# Patient Record
Sex: MALE | Race: AMERICAN INDIAN OR ALASKA NATIVE
[De-identification: names, ages, dates, MRNs, and addresses within clinical notes are randomized per-mention and may not be internally consistent; named-entity substitution may affect disease eponyms.]

---

## 2017-02-09 ENCOUNTER — HOSPITAL ENCOUNTER (EMERGENCY)
Dept: HOSPITAL 5 - ED | Age: 42
Discharge: HOME | End: 2017-02-09
Payer: SELF-PAY

## 2017-02-09 VITALS — DIASTOLIC BLOOD PRESSURE: 77 MMHG | SYSTOLIC BLOOD PRESSURE: 123 MMHG

## 2017-02-09 DIAGNOSIS — G43.909: ICD-10-CM

## 2017-02-09 DIAGNOSIS — I10: ICD-10-CM

## 2017-02-09 DIAGNOSIS — Z86.73: ICD-10-CM

## 2017-02-09 DIAGNOSIS — K21.9: ICD-10-CM

## 2017-02-09 DIAGNOSIS — M54.9: ICD-10-CM

## 2017-02-09 DIAGNOSIS — M79.642: ICD-10-CM

## 2017-02-09 DIAGNOSIS — E11.9: Primary | ICD-10-CM

## 2017-02-09 DIAGNOSIS — G89.29: ICD-10-CM

## 2017-02-09 LAB
ANION GAP SERPL CALC-SCNC: 23 MMOL/L
BASOPHILS NFR BLD AUTO: 0.7 % (ref 0–1.8)
BILIRUB UR QL STRIP: (no result)
BLOOD UR QL VISUAL: (no result)
BUN SERPL-MCNC: 13 MG/DL (ref 9–20)
BUN/CREAT SERPL: 16.25 %
CALCIUM SERPL-MCNC: 9.7 MG/DL (ref 8.4–10.2)
CHLORIDE SERPL-SCNC: 94.5 MMOL/L (ref 98–107)
CO2 SERPL-SCNC: 25 MMOL/L (ref 22–30)
EOSINOPHIL NFR BLD AUTO: 1.9 % (ref 0–4.3)
GLUCOSE SERPL-MCNC: 374 MG/DL (ref 75–100)
HCT VFR BLD CALC: 38.4 % (ref 35.5–45.6)
HGB BLD-MCNC: 12.5 GM/DL (ref 11.8–15.2)
KETONES UR STRIP-MCNC: (no result) MG/DL
LEUKOCYTE ESTERASE UR QL STRIP: (no result)
MCH RBC QN AUTO: 25 PG (ref 28–32)
MCHC RBC AUTO-ENTMCNC: 33 % (ref 32–34)
MCV RBC AUTO: 78 FL (ref 84–94)
MUCOUS THREADS #/AREA URNS HPF: (no result) /HPF
NITRITE UR QL STRIP: (no result)
PH UR STRIP: 5 [PH] (ref 5–7)
PLATELET # BLD: 292 K/MM3 (ref 140–440)
POTASSIUM SERPL-SCNC: 4.3 MMOL/L (ref 3.6–5)
PROT UR STRIP-MCNC: (no result) MG/DL
RBC # BLD AUTO: 4.95 M/MM3 (ref 3.65–5.03)
RBC #/AREA URNS HPF: 3 /HPF (ref 0–6)
SODIUM SERPL-SCNC: 138 MMOL/L (ref 137–145)
UROBILINOGEN UR-MCNC: < 2 MG/DL (ref ?–2)
WBC # BLD AUTO: 5 K/MM3 (ref 4.5–11)
WBC #/AREA URNS HPF: < 1 /HPF (ref 0–6)

## 2017-02-09 PROCEDURE — 84484 ASSAY OF TROPONIN QUANT: CPT

## 2017-02-09 PROCEDURE — 82010 KETONE BODYS QUAN: CPT

## 2017-02-09 PROCEDURE — 93010 ELECTROCARDIOGRAM REPORT: CPT

## 2017-02-09 PROCEDURE — 96372 THER/PROPH/DIAG INJ SC/IM: CPT

## 2017-02-09 PROCEDURE — 83036 HEMOGLOBIN GLYCOSYLATED A1C: CPT

## 2017-02-09 PROCEDURE — 80048 BASIC METABOLIC PNL TOTAL CA: CPT

## 2017-02-09 PROCEDURE — 81001 URINALYSIS AUTO W/SCOPE: CPT

## 2017-02-09 PROCEDURE — 99284 EMERGENCY DEPT VISIT MOD MDM: CPT

## 2017-02-09 PROCEDURE — 36415 COLL VENOUS BLD VENIPUNCTURE: CPT

## 2017-02-09 PROCEDURE — 82962 GLUCOSE BLOOD TEST: CPT

## 2017-02-09 PROCEDURE — 85025 COMPLETE CBC W/AUTO DIFF WBC: CPT

## 2017-02-09 PROCEDURE — 84550 ASSAY OF BLOOD/URIC ACID: CPT

## 2017-02-09 PROCEDURE — 71020: CPT

## 2017-02-09 PROCEDURE — 82805 BLOOD GASES W/O2 SATURATION: CPT

## 2017-02-09 PROCEDURE — 93005 ELECTROCARDIOGRAM TRACING: CPT

## 2017-02-09 NOTE — EMERGENCY DEPARTMENT REPORT
ED General Adult HPI





- General


Chief complaint: Dyspnea/Respdistress


Stated complaint: LT HAND SWELLING/NUMBNESS/ELEVATED SUGAR


Time Seen by Provider: 02/09/17 17:18


Source: patient


Mode of arrival: Ambulatory


Limitations: No Limitations





- History of Present Illness


Initial comments: 





41-year-old male with a past medical history of hypertension and chronic back 

pain with associated left leg radiculopathy presents to the hospital with 

complaints of elevated sugar and intermittent left hand pain and swelling.  

Patient denies a previous history of diabetes.  His sugar was in the 380s at 

home so he came to the ER for evaluation.  Patient last meal was this morning 

he has been drinking water since being in the ED.  Patient complains of 

intermittent left hand swelling for the last 4 days.  Pain is in the whole hand

, worse with palpation and movement.  No alleviating factors.  Pain rated 

moderate to severe in intensity.  He complains of shooting pain extending from 

his hand up to his elbow.  Denies a previous history of gout or trauma.  

Patient has been having intermittent hand swelling for the past 6-7 months and 

has not had it evaluated by a physician.  PMD: None





- Related Data


 Previous Rx's











 Medication  Instructions  Recorded  Last Taken  Type


 


Carvedilol [Coreg] 12.5 mg PO BID #60 tablet 05/24/16 Unknown Rx


 


HYDROcodone/APAP 5-325 [[place] 1 each PO Q6HR PRN #20 tablet 05/24/16 Unknown Rx





5-325 mg TAB]    


 


Lisinopril/Hydrochlorothiazide 1 tab PO QDAY #30 tablet 05/24/16 Unknown Rx





[Zestoretic 20-25 mg]    


 


amLODIPine [Norvasc] 10 mg PO DAILY #30 tablet 05/24/16 Unknown Rx


 


cloNIDine [Catapres] 0.1 mg PO BID #60 tablet 05/24/16 Unknown Rx


 


Diazepam Tab [Valium] 5 mg PO TID PRN #12 tablet 08/23/16 Unknown Rx


 


Ibuprofen [Motrin 600 MG tab] 600 mg PO Q8H PRN #20 tablet 08/23/16 Unknown Rx


 


hydrALAZINE [Apresoline TAB] 25 mg PO Q8HR #90 tab 08/23/16 Unknown Rx


 


Ibuprofen [Motrin] 800 mg PO Q8HR PRN #30 tablet 02/09/17 Unknown Rx


 


metFORMIN [Glucophage] 500 mg PO BID #60 tablet 02/09/17 Unknown Rx


 


oxyCODONE /ACETAMINOPHEN [Percocet 1 tab PO Q6HR PRN #20 tablet 02/09/17 

Unknown Rx





5/325]    











 Allergies











Allergy/AdvReac Type Severity Reaction Status Date / Time


 


tomato [Tomato] Allergy  Swelling Verified 08/23/16 09:43














ED Review of Systems


ROS: 


Stated complaint: LT HAND SWELLING/NUMBNESS/ELEVATED SUGAR


Other details as noted in HPI





Comment: All other systems reviewed and negative


Other: 





Constitutional: No fevers chills 


Eyes: No eye pain visual changes 


ENT: No ear pain or throat pain


Neck: Denies pain


Respiratory: Denies cough wheezing shortness of breath 


Cardiovascular: Denies chest pain, palpitations, syncope


GI: Denies abdominal pain, nausea, vomiting, diarrhea


: Denies dysuria


Musculoskeletal: as per hpi


Skin: Denies rash, lesions, erythema


Neurologic: Denies headache, numbness, weakness








ED Past Medical Hx





- Past Medical History


Hx Hypertension: Yes


Hx CVA: Yes (MILD LEFT SIDED WEAKNESS)


Hx Congestive Heart Failure: No


Hx Diabetes: No


Hx GERD: Yes


Hx Headaches / Migraines: Yes


Hx Asthma: No


Hx COPD: No


Additional medical history: sleep apnea.  chronic back pain





- Surgical History


Additional Surgical History: Right upper extremity surgery secondary to trauma, 

cardiac catheterization





- Social History


Smoking Status: Never Smoker


Substance Use Type: Prescribed





- Medications


Home Medications: 


 Home Medications











 Medication  Instructions  Recorded  Confirmed  Last Taken  Type


 


Carvedilol [Coreg] 12.5 mg PO BID #60 tablet 05/24/16  Unknown Rx


 


HYDROcodone/APAP 5-325 [[place] 1 each PO Q6HR PRN #20 tablet 05/24/16  Unknown Rx





5-325 mg TAB]     


 


Lisinopril/Hydrochlorothiazide 1 tab PO QDAY #30 tablet 05/24/16  Unknown Rx





[Zestoretic 20-25 mg]     


 


amLODIPine [Norvasc] 10 mg PO DAILY #30 tablet 05/24/16  Unknown Rx


 


cloNIDine [Catapres] 0.1 mg PO BID #60 tablet 05/24/16  Unknown Rx


 


Diazepam Tab [Valium] 5 mg PO TID PRN #12 tablet 08/23/16  Unknown Rx


 


Ibuprofen [Motrin 600 MG tab] 600 mg PO Q8H PRN #20 tablet 08/23/16  Unknown Rx


 


hydrALAZINE [Apresoline TAB] 25 mg PO Q8HR #90 tab 08/23/16  Unknown Rx


 


Ibuprofen [Motrin] 800 mg PO Q8HR PRN #30 tablet 02/09/17  Unknown Rx


 


metFORMIN [Glucophage] 500 mg PO BID #60 tablet 02/09/17  Unknown Rx


 


oxyCODONE /ACETAMINOPHEN [Percocet 1 tab PO Q6HR PRN #20 tablet 02/09/17  

Unknown Rx





5/325]     














ED Physical Exam





- General


Limitations: No Limitations





- Other


Other exam information: 





General: No limitations, patient is alert in no acute distress


Head exam: Atraumatic, normocephalic


Eyes exam: Normal appearance, pupils equal reactive to light, extraocular 

movements intact


ENT: Moist mucous membrane, normal oropharynx


Neck exam: Normal inspection, full range of motion, no meningismus nontender


Respiratory exam: Clear to auscultation bilateral, no wheezes, rales, crackles


Cardiovascular: Normal rate and rhythm, normal heart sounds


Abdomen: Soft, nondistended, and  nontender, with normal bowel sounds, no 

rebound, or guarding


Extremity: Full range of motion, mild generalized left hand swelling noted 

without warmth or erythema.  Tenderness to the entire hand without any specific 

area being more tender.  Refill less than 2 seconds.


Back: Normal Inspection, full range of motion, chronic back pain


Neurologic: Alert, oriented x3, cranial nerves intact, chronic left leg 

radiculopathy/numbness


Psychiatric: normal affect, normal mood


Skin: Warm, dry, intact





ED Course


 Vital Signs











  02/09/17





  10:06


 


Temperature 97.7 F


 


Pulse Rate 81


 


Respiratory 18





Rate 


 


Blood Pressure 123/77


 


O2 Sat by Pulse 95





Oximetry 














- Reevaluation(s)


Reevaluation #1: 





02/09/17 20:12


Patient treated in the ED with Percocet and insulin subcutaneous as well as 

left wrist splint





ED Medical Decision Making





- Lab Data


Result diagrams: 


 02/09/17 10:44





 02/09/17 10:44








 Lab Results











  02/09/17 02/09/17 02/09/17 Range/Units





  10:11 10:44 10:44 


 


WBC   5.0   (4.5-11.0)  K/mm3


 


RBC   4.95   (3.65-5.03)  M/mm3


 


Hgb   12.5   (11.8-15.2)  gm/dl


 


Hct   38.4   (35.5-45.6)  %


 


MCV   78 L   (84-94)  fl


 


MCH   25 L   (28-32)  pg


 


MCHC   33   (32-34)  %


 


RDW   14.6   (13.2-15.2)  %


 


Plt Count   292   (140-440)  K/mm3


 


Lymph % (Auto)   28.1   (13.4-35.0)  %


 


Mono % (Auto)   7.2   (0.0-7.3)  %


 


Eos % (Auto)   1.9   (0.0-4.3)  %


 


Baso % (Auto)   0.7   (0.0-1.8)  %


 


Lymph #   1.4   (1.2-5.4)  K/mm3


 


Mono #   0.4   (0.0-0.8)  K/mm3


 


Eos #   0.1   (0.0-0.4)  K/mm3


 


Baso #   0.0   (0.0-0.1)  K/mm3


 


Seg Neutrophils %   62.1   (40.0-70.0)  %


 


Seg Neutrophils #   3.1   (1.8-7.7)  K/mm3


 


VBG pH     (7.320-7.420)  


 


Sodium    138  (137-145)  mmol/L


 


Potassium    4.3  (3.6-5.0)  mmol/L


 


Chloride    94.5 L  ()  mmol/L


 


Carbon Dioxide    25  (22-30)  mmol/L


 


Anion Gap    23  mmol/L


 


BUN    13  (9-20)  mg/dL


 


Creatinine    0.8  (0.8-1.5)  mg/dL


 


Estimated GFR    > 60  ml/min


 


BUN/Creatinine Ratio    16.25  %


 


Glucose    374 H  ()  mg/dL


 


POC Glucose  387 H    ()  


 


Hemoglobin A1c     (4-6)  %


 


Uric Acid     (3.5-7.6)  mg/dL


 


Calcium    9.7  (8.4-10.2)  mg/dL


 


Troponin T    < 0.010  (0.00-0.029)  ng/mL


 


Urine Color     (Yellow)  


 


Urine Turbidity     (Clear)  


 


Urine pH     (5.0-7.0)  


 


Ur Specific Gravity     (1.003-1.030)  


 


Urine Protein     (Negative)  mg/dL


 


Urine Glucose (UA)     (Negative)  mg/dL


 


Urine Ketones     (Negative)  mg/dL


 


Urine Blood     (Negative)  


 


Urine Nitrite     (Negative)  


 


Urine Bilirubin     (Negative)  


 


Urine Urobilinogen     (<2.0)  mg/dL


 


Ur Leukocyte Esterase     (Negative)  


 


Urine WBC (Auto)     (0.0-6.0)  /HPF


 


Urine RBC (Auto)     (0.0-6.0)  /HPF


 


Urine Mucus     /HPF


 


Ketones    0.8  (0.2-2.8)  mg/dL














  02/09/17 02/09/17 02/09/17 Range/Units





  10:44 10:44 10:44 


 


WBC     (4.5-11.0)  K/mm3


 


RBC     (3.65-5.03)  M/mm3


 


Hgb     (11.8-15.2)  gm/dl


 


Hct     (35.5-45.6)  %


 


MCV     (84-94)  fl


 


MCH     (28-32)  pg


 


MCHC     (32-34)  %


 


RDW     (13.2-15.2)  %


 


Plt Count     (140-440)  K/mm3


 


Lymph % (Auto)     (13.4-35.0)  %


 


Mono % (Auto)     (0.0-7.3)  %


 


Eos % (Auto)     (0.0-4.3)  %


 


Baso % (Auto)     (0.0-1.8)  %


 


Lymph #     (1.2-5.4)  K/mm3


 


Mono #     (0.0-0.8)  K/mm3


 


Eos #     (0.0-0.4)  K/mm3


 


Baso #     (0.0-0.1)  K/mm3


 


Seg Neutrophils %     (40.0-70.0)  %


 


Seg Neutrophils #     (1.8-7.7)  K/mm3


 


VBG pH  7.403    (7.320-7.420)  


 


Sodium     (137-145)  mmol/L


 


Potassium     (3.6-5.0)  mmol/L


 


Chloride     ()  mmol/L


 


Carbon Dioxide     (22-30)  mmol/L


 


Anion Gap     mmol/L


 


BUN     (9-20)  mg/dL


 


Creatinine     (0.8-1.5)  mg/dL


 


Estimated GFR     ml/min


 


BUN/Creatinine Ratio     %


 


Glucose     ()  mg/dL


 


POC Glucose     ()  


 


Hemoglobin A1c   14.7 H   (4-6)  %


 


Uric Acid    7.0  (3.5-7.6)  mg/dL


 


Calcium     (8.4-10.2)  mg/dL


 


Troponin T     (0.00-0.029)  ng/mL


 


Urine Color     (Yellow)  


 


Urine Turbidity     (Clear)  


 


Urine pH     (5.0-7.0)  


 


Ur Specific Gravity     (1.003-1.030)  


 


Urine Protein     (Negative)  mg/dL


 


Urine Glucose (UA)     (Negative)  mg/dL


 


Urine Ketones     (Negative)  mg/dL


 


Urine Blood     (Negative)  


 


Urine Nitrite     (Negative)  


 


Urine Bilirubin     (Negative)  


 


Urine Urobilinogen     (<2.0)  mg/dL


 


Ur Leukocyte Esterase     (Negative)  


 


Urine WBC (Auto)     (0.0-6.0)  /HPF


 


Urine RBC (Auto)     (0.0-6.0)  /HPF


 


Urine Mucus     /HPF


 


Ketones     (0.2-2.8)  mg/dL














  02/09/17 02/09/17 Range/Units





  11:32 17:28 


 


WBC    (4.5-11.0)  K/mm3


 


RBC    (3.65-5.03)  M/mm3


 


Hgb    (11.8-15.2)  gm/dl


 


Hct    (35.5-45.6)  %


 


MCV    (84-94)  fl


 


MCH    (28-32)  pg


 


MCHC    (32-34)  %


 


RDW    (13.2-15.2)  %


 


Plt Count    (140-440)  K/mm3


 


Lymph % (Auto)    (13.4-35.0)  %


 


Mono % (Auto)    (0.0-7.3)  %


 


Eos % (Auto)    (0.0-4.3)  %


 


Baso % (Auto)    (0.0-1.8)  %


 


Lymph #    (1.2-5.4)  K/mm3


 


Mono #    (0.0-0.8)  K/mm3


 


Eos #    (0.0-0.4)  K/mm3


 


Baso #    (0.0-0.1)  K/mm3


 


Seg Neutrophils %    (40.0-70.0)  %


 


Seg Neutrophils #    (1.8-7.7)  K/mm3


 


VBG pH    (7.320-7.420)  


 


Sodium    (137-145)  mmol/L


 


Potassium    (3.6-5.0)  mmol/L


 


Chloride    ()  mmol/L


 


Carbon Dioxide    (22-30)  mmol/L


 


Anion Gap    mmol/L


 


BUN    (9-20)  mg/dL


 


Creatinine    (0.8-1.5)  mg/dL


 


Estimated GFR    ml/min


 


BUN/Creatinine Ratio    %


 


Glucose    ()  mg/dL


 


POC Glucose   198 H  ()  


 


Hemoglobin A1c    (4-6)  %


 


Uric Acid    (3.5-7.6)  mg/dL


 


Calcium    (8.4-10.2)  mg/dL


 


Troponin T    (0.00-0.029)  ng/mL


 


Urine Color  Straw   (Yellow)  


 


Urine Turbidity  Clear   (Clear)  


 


Urine pH  5.0   (5.0-7.0)  


 


Ur Specific Gravity  1.024   (1.003-1.030)  


 


Urine Protein  <15 mg/dl   (Negative)  mg/dL


 


Urine Glucose (UA)  >=500   (Negative)  mg/dL


 


Urine Ketones  Neg   (Negative)  mg/dL


 


Urine Blood  Neg   (Negative)  


 


Urine Nitrite  Neg   (Negative)  


 


Urine Bilirubin  Neg   (Negative)  


 


Urine Urobilinogen  < 2.0   (<2.0)  mg/dL


 


Ur Leukocyte Esterase  Neg   (Negative)  


 


Urine WBC (Auto)  < 1.0   (0.0-6.0)  /HPF


 


Urine RBC (Auto)  3.0   (0.0-6.0)  /HPF


 


Urine Mucus  Few   /HPF


 


Ketones    (0.2-2.8)  mg/dL














- EKG Data


-: EKG Interpreted by Me (nsr 72 flat lat t waves)





- EKG Data


When compared to previous EKG there are: no significant change (compared to 5/24 /16)





- Radiology Data


Radiology results: image reviewed (cxr : naf)





- Medical Decision Making





Elevated hemoglobin A1c conference.  Patient is a new onset diabetic.  He will 

be  started metformin 500 mg twice a day.,  Shortness time the cause of patient'

s intermittent left hand pain and swelling.  Does not appear to be infectious 

or trauma related at this time.  Patient placed in a wrist splint for comfort 

and will be instructed to follow-up with primary care doctor for further 

evaluation.





- Differential Diagnosis


arthritis, radiculopathy, carpal tunnel syndrome, neuropathy, diabetes


Critical Care Time: No


Critical care attestation.: 


If time is entered above; I have spent that time in minutes in the direct care 

of this critically ill patient, excluding procedure time.








ED Disposition


Clinical Impression: 


 Chronic back pain, Left hand pain, Diabetes mellitus, new onset





Disposition: DISCHARGED TO HOME OR SELFCARE


Is pt being admited?: No


Does the pt Need Aspirin: No


Condition: Stable


Instructions:  Arthralgia (ED), Diabetes Mellitus Type 2 in Adults (ED)


Additional Instructions: 


Take the medication as prescribed.  Is very important that you follow up with 

the primary care doctor and orthopedic doctor provided for further workup and 

evaluation


Prescriptions: 


Ibuprofen [Motrin] 800 mg PO Q8HR PRN #30 tablet


 PRN Reason: Pain


metFORMIN [Glucophage] 500 mg PO BID #60 tablet


oxyCODONE /ACETAMINOPHEN [Percocet 5/325] 1 tab PO Q6HR PRN #20 tablet


 PRN Reason: Pain


Referrals: 


[facility] [Provider Group] - 3-5 Days


MARVIN CAMP MD [Staff Physician] - 3-5 Days (orthopedic)


KATI ALVARADO MD [Staff Physician] - 3-5 Days (primary care)


Time of Disposition: 20:18

## 2017-04-22 ENCOUNTER — HOSPITAL ENCOUNTER (EMERGENCY)
Dept: HOSPITAL 5 - ED | Age: 42
Discharge: HOME | End: 2017-04-22
Payer: SELF-PAY

## 2017-04-22 VITALS — SYSTOLIC BLOOD PRESSURE: 125 MMHG | DIASTOLIC BLOOD PRESSURE: 76 MMHG

## 2017-04-22 DIAGNOSIS — Z91.018: ICD-10-CM
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
## 2017-04-22 DIAGNOSIS — M25.512: ICD-10-CM

## 2017-04-22 DIAGNOSIS — S40.022A: Primary | ICD-10-CM

## 2017-04-22 DIAGNOSIS — K21.9: ICD-10-CM
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
## 2017-04-22 DIAGNOSIS — E11.9: ICD-10-CM
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
## 2017-04-22 DIAGNOSIS — G89.29: ICD-10-CM

## 2017-04-22 DIAGNOSIS — I63.9: ICD-10-CM

## 2017-04-22 DIAGNOSIS — I10: ICD-10-CM
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
## 2017-04-22 DIAGNOSIS — Z87.891: ICD-10-CM

## 2017-04-22 DIAGNOSIS — M62.838: ICD-10-CM

## 2017-04-22 PROCEDURE — 72040 X-RAY EXAM NECK SPINE 2-3 VW: CPT

## 2017-04-22 NOTE — XRAY REPORT
Cervical spine 3 views:



History: Sharp pain radiating to neck.



Findings:



Normal height of vertebral bodies. Minimal decrease in height of C4-C5 

and C5-C6 with anterior osteophytes at the articular surfaces. No 

fracture. Normal prevertebral soft tissue.



Impression:



Spondylosis C4-C5 and C5-C6.

## 2017-04-22 NOTE — EMERGENCY DEPARTMENT REPORT
ED Neck Pain/Injury HPI





- General


Chief Complaint: Neck Pain/Injury


Stated Complaint: LEFT ARM SWOLLEN/NUMB/NECK PAINS/STIFFNESS/CHEST P


Time Seen by Provider: 04/22/17 10:46


Source: patient


Mode of arrival: Ambulatory


Limitations: Physical Limitation





- History of Present Illness


Initial Comments: 





patient states that 2 days ago he was hit in left shoulder/arm by a door.  The 

next day his upper arm was hurting and then today he woke up with pain in left 

side of his neck.  


MD Complaint: neck pain


-: Gradual, This morning


Radiation: left shoulder


Severity: moderate


Severity scale (0 -10): 5


Quality: sharp, other (tight)


Consistency: intermittent


Improves With: remaining still


Worsens With: movement of extremity, movement of neck


Associated Symptoms: denies: headache, fever, numbness, tingling, weakness, 

vertigo, difficulty walking, swollen glands, nausea, vomiting


Treatments Prior to Arrival: none





- Related Data


 Previous Rx's











 Medication  Instructions  Recorded  Last Taken  Type


 


Carvedilol [Coreg] 12.5 mg PO BID #60 tablet 05/24/16 Unknown Rx


 


Lisinopril/Hydrochlorothiazide 1 tab PO QDAY #30 tablet 05/24/16 Unknown Rx





[Zestoretic 20-25 mg]    


 


amLODIPine [Norvasc] 10 mg PO DAILY #30 tablet 05/24/16 Unknown Rx


 


cloNIDine [Catapres] 0.1 mg PO BID #60 tablet 05/24/16 Unknown Rx


 


hydrALAZINE [Apresoline TAB] 25 mg PO Q8HR #90 tab 08/23/16 Unknown Rx


 


metFORMIN [Glucophage] 500 mg PO BID #60 tablet 02/09/17 Unknown Rx


 


Cyclobenzaprine [Flexeril] 10 mg PO TID PRN #30 tablet 04/22/17 Unknown Rx


 


Naproxen [Naprosyn TAB] 500 mg PO BID #30 tablet 04/22/17 Unknown Rx


 


traMADol [Ultram] 50 mg PO Q4HR PRN #30 tablet 04/22/17 Unknown Rx











 Allergies











Allergy/AdvReac Type Severity Reaction Status Date / Time


 


tomato [Tomato] Allergy  Swelling Verified 04/22/17 10:16














ED Review of Systems


ROS: 


Stated complaint: LEFT ARM SWOLLEN/NUMB/NECK PAINS/STIFFNESS/CHEST P


Other details as noted in HPI





Constitutional: denies: chills, fever


Eyes: denies: eye pain, eye discharge, vision change


ENT: denies: ear pain, throat pain


Respiratory: denies: cough, shortness of breath, wheezing


Cardiovascular: denies: chest pain, palpitations


Endocrine: no symptoms reported


Gastrointestinal: denies: abdominal pain, nausea, diarrhea


Genitourinary: denies: urgency, dysuria


Musculoskeletal: arthralgia, myalgia.  denies: back pain


Skin: denies: rash, lesions


Neurological: denies: headache, weakness, numbness, paresthesias


Psychiatric: denies: anxiety, depression


Hematological/Lymphatic: denies: easy bleeding, easy bruising





ED Past Medical Hx





- Past Medical History


Hx Hypertension: Yes


Hx CVA: Yes (MILD LEFT SIDED WEAKNESS)


Hx Congestive Heart Failure: No


Hx Diabetes: Yes


Hx GERD: Yes


Hx Headaches / Migraines: Yes


Hx Asthma: No


Hx COPD: No


Additional medical history: sleep apnea.  chronic back pain





- Surgical History


Additional Surgical History: Right upper extremity surgery secondary to trauma, 

cardiac catheterization





- Social History


Smoking Status: Former Smoker


Substance Use Type: Prescribed





- Medications


Home Medications: 


 Home Medications











 Medication  Instructions  Recorded  Confirmed  Last Taken  Type


 


Carvedilol [Coreg] 12.5 mg PO BID #60 tablet 05/24/16  Unknown Rx


 


Lisinopril/Hydrochlorothiazide 1 tab PO QDAY #30 tablet 05/24/16  Unknown Rx





[Zestoretic 20-25 mg]     


 


amLODIPine [Norvasc] 10 mg PO DAILY #30 tablet 05/24/16  Unknown Rx


 


cloNIDine [Catapres] 0.1 mg PO BID #60 tablet 05/24/16  Unknown Rx


 


hydrALAZINE [Apresoline TAB] 25 mg PO Q8HR #90 tab 08/23/16  Unknown Rx


 


metFORMIN [Glucophage] 500 mg PO BID #60 tablet 02/09/17  Unknown Rx


 


Cyclobenzaprine [Flexeril] 10 mg PO TID PRN #30 tablet 04/22/17  Unknown Rx


 


Naproxen [Naprosyn TAB] 500 mg PO BID #30 tablet 04/22/17  Unknown Rx


 


traMADol [Ultram] 50 mg PO Q4HR PRN #30 tablet 04/22/17  Unknown Rx














ED Physical Exam





- General


Limitations: Physical Limitation


General appearance: alert, in no apparent distress





- Head


Head exam: Present: atraumatic, normocephalic





- Eye


Eye exam: Present: normal appearance, PERRL, EOMI


Pupils: Present: normal accommodation





- ENT


ENT exam: Present: normal exam, mucous membranes moist





- Neck


Neck exam: Present: normal inspection, tenderness (left lateral muscle 

tenderness and swelling noted ).  Absent: full ROM (limited right lateral 

rotation due to pain), lymphadenopathy, thyromegaly





- Respiratory


Respiratory exam: Present: normal lung sounds bilaterally.  Absent: respiratory 

distress





- Cardiovascular


Cardiovascular Exam: Present: regular rate, normal rhythm.  Absent: systolic 

murmur, diastolic murmur, rubs, gallop





- GI/Abdominal


GI/Abdominal exam: Present: soft, normal bowel sounds





- Rectal


Rectal exam: Present: deferred





- Extremities Exam


Extremities exam: Present: normal inspection, tenderness (left shoulder 

tenderness), normal capillary refill.  Absent: full ROM (limited left shoulder 

abduction due to pain), pedal edema, joint swelling





- Expanded Upper Extremity Exam


  ** Left


Shoulder Exam: Present: tenderness (left superior and lateral shoulder and 

proximal arm tenderness).  Absent: full ROM (limitied abduction due to pain), 

abrasion, laceration, ecchymosis, deformity, dislocation, erythema, tenderness 

over AC joint


Elbow exam: Present: normal inspection


Vascular: Present: normal capillary refill.  Absent: vascular compromise, pulse 

deficit brachial art





- Back Exam


Back exam: Present: normal inspection





- Neurological Exam


Neurological exam: Present: alert, oriented X3, CN II-XII intact, reflexes 

normal.  Absent: motor sensory deficit





- Psychiatric


Psychiatric exam: Present: normal affect, normal mood





- Skin


Skin exam: Present: warm, dry, intact, normal color.  Absent: rash





ED Course


 Vital Signs











  04/22/17





  10:18


 


Temperature 98.2 F


 


Pulse Rate 77


 


Respiratory 16





Rate 


 


Blood Pressure 125/76


 


O2 Sat by Pulse 96





Oximetry 














ED Medical Decision Making





- Radiology Data


Radiology results: report reviewed, image reviewed





Spondylosis of C4-C5 and C5-C6.  Shoulder with arthritic changes and cortical 

cystic irregularity consistent with old injury vs chronic impingement.  No 

acute pathology noted





- Medical Decision Making





Patient is non-toxic and hemodynamically stable.  Exam findings are consistent 

with muscular injury and spasm.  Images reviewed and discussed with patient.  

Will treat conservatively now with referral to ortho.  Patient is stable for 

discharge. 


Critical care attestation.: 


If time is entered above; I have spent that time in minutes in the direct care 

of this critically ill patient, excluding procedure time.








ED Disposition


Clinical Impression: 


 Muscle spasms of neck, Left shoulder pain, Contusion of left arm





Disposition: DISCHARGED TO HOME OR SELFCARE


Is pt being admited?: No


Does the pt Need Aspirin: No


Condition: Good


Instructions:  Spasmodic Torticollis (ED), Contusion in Adults (ED), 

Degenerative Disc Disease (ED)


Prescriptions: 


Cyclobenzaprine [Flexeril] 10 mg PO TID PRN #30 tablet


 PRN Reason: Muscle Spasm


Naproxen [Naprosyn TAB] 500 mg PO BID #30 tablet


traMADol [Ultram] 50 mg PO Q4HR PRN #30 tablet


 PRN Reason: Pain


Referrals: 


PRIMARY CARE,MD [Primary Care Provider] - 3-5 Days


MARVIN CAMP MD [Staff Physician] - 3-5 Days


Time of Disposition: 12:26

## 2017-04-22 NOTE — XRAY REPORT
Left shoulder 3 views:



History: Left shoulder pain. Injury.



Findings:



Mild arthritic changes a.c. joint. The glenohumeral joint appears 

unremarkable. Cortical irregularity with cystic changes at the greater 

tuberosity may be related to old injury/chronic impingement. No soft 

tissue calcification.



Impression:



Findings as detailed above.

## 2017-09-18 ENCOUNTER — HOSPITAL ENCOUNTER (EMERGENCY)
Dept: HOSPITAL 5 - ED | Age: 42
Discharge: HOME | End: 2017-09-18
Payer: SELF-PAY

## 2017-09-18 VITALS — SYSTOLIC BLOOD PRESSURE: 132 MMHG | DIASTOLIC BLOOD PRESSURE: 78 MMHG

## 2017-09-18 DIAGNOSIS — K21.9: ICD-10-CM

## 2017-09-18 DIAGNOSIS — E11.9: ICD-10-CM

## 2017-09-18 DIAGNOSIS — R07.89: Primary | ICD-10-CM

## 2017-09-18 DIAGNOSIS — G43.909: ICD-10-CM

## 2017-09-18 DIAGNOSIS — I10: ICD-10-CM

## 2017-09-18 DIAGNOSIS — R06.02: ICD-10-CM

## 2017-09-18 DIAGNOSIS — Z91.018: ICD-10-CM

## 2017-09-18 DIAGNOSIS — Z86.73: ICD-10-CM

## 2017-09-18 LAB
ANION GAP SERPL CALC-SCNC: 20 MMOL/L
BASOPHILS NFR BLD AUTO: 0.4 % (ref 0–1.8)
BUN SERPL-MCNC: 18 MG/DL (ref 9–20)
BUN/CREAT SERPL: 25.71 %
CALCIUM SERPL-MCNC: 9.4 MG/DL (ref 8.4–10.2)
CHLORIDE SERPL-SCNC: 95.8 MMOL/L (ref 98–107)
CO2 SERPL-SCNC: 26 MMOL/L (ref 22–30)
EOSINOPHIL NFR BLD AUTO: 1.2 % (ref 0–4.3)
GLUCOSE SERPL-MCNC: 140 MG/DL (ref 75–100)
HCT VFR BLD CALC: 35 % (ref 35.5–45.6)
HGB BLD-MCNC: 11.8 GM/DL (ref 11.8–15.2)
MCH RBC QN AUTO: 25 PG (ref 28–32)
MCHC RBC AUTO-ENTMCNC: 34 % (ref 32–34)
MCV RBC AUTO: 75 FL (ref 84–94)
PLATELET # BLD: 272 K/MM3 (ref 140–440)
POTASSIUM SERPL-SCNC: 3.5 MMOL/L (ref 3.6–5)
RBC # BLD AUTO: 4.67 M/MM3 (ref 3.65–5.03)
SODIUM SERPL-SCNC: 138 MMOL/L (ref 137–145)
WBC # BLD AUTO: 6.8 K/MM3 (ref 4.5–11)

## 2017-09-18 PROCEDURE — 36415 COLL VENOUS BLD VENIPUNCTURE: CPT

## 2017-09-18 PROCEDURE — 85379 FIBRIN DEGRADATION QUANT: CPT

## 2017-09-18 PROCEDURE — 71010: CPT

## 2017-09-18 PROCEDURE — 80048 BASIC METABOLIC PNL TOTAL CA: CPT

## 2017-09-18 PROCEDURE — 99285 EMERGENCY DEPT VISIT HI MDM: CPT

## 2017-09-18 PROCEDURE — 85025 COMPLETE CBC W/AUTO DIFF WBC: CPT

## 2017-09-18 PROCEDURE — 96374 THER/PROPH/DIAG INJ IV PUSH: CPT

## 2017-09-18 PROCEDURE — 93005 ELECTROCARDIOGRAM TRACING: CPT

## 2017-09-18 PROCEDURE — 84484 ASSAY OF TROPONIN QUANT: CPT

## 2017-09-18 PROCEDURE — 70450 CT HEAD/BRAIN W/O DYE: CPT

## 2017-09-18 PROCEDURE — 83880 ASSAY OF NATRIURETIC PEPTIDE: CPT

## 2017-09-18 PROCEDURE — 93010 ELECTROCARDIOGRAM REPORT: CPT

## 2017-09-18 NOTE — CAT SCAN REPORT
FINAL REPORT



PROCEDURE:  CT HEAD/BRAIN WO CON



TECHNIQUE:  Computerized tomography of the head was performed

without contrast material. 



HISTORY:  headache 



COMPARISON:  12/17/2015



FINDINGS:  

Skull and scalp: Normal.



Paranasal sinuses: Mucous retention cyst in the left maxillary

sinus 



Ventricles and subarachnoid spaces: Normal.



Cerebrum: No evidence of hemorrhage, acute infarction or mass .



Cerebellum and brainstem: No evidence of hemorrhage, acute

infarction or mass.



Vasculature: Normal.



Comments: Moderate diffuse atrophy. Parafalcine calcifications

seen prominently lobular 2.8 centimeters x 1.4 centimeters

anteriorly and 1.8 x 0.8 centimeters posteriorly with other

smaller calcifications present. 



IMPRESSION:  

No acute intracranial pathology seen at this time

## 2017-09-18 NOTE — EMERGENCY DEPARTMENT REPORT
ED Chest Pain HPI





- General


Chief Complaint: Chest Pain


Stated Complaint: CHEST PAIN


Time Seen by Provider: 09/18/17 17:21


Source: patient


Mode of arrival: Stretcher


Limitations: No Limitations





- History of Present Illness


Initial Comments: 





42 years old male history of high blood pressure diabetes and irregular 

heartbeat presented with left-sided chest pain that is being unknown for 2 

weeks worse since last night associated with shortness of breath described the 

pain as pressure that radiated to his left shoulder.  Patient also stated that 

he's been having a headache for the last 2 month does not have any history of 

frequent headache before.  Patient denied any fever no nausea no vomiting.


MD Complaint: chest pain


-: Gradual


Onset: during rest, during exertion


Severity: moderate


Severity scale (0 -10): 6


Quality: pressure


Worsens With: exertion





- Related Data


 Previous Rx's











 Medication  Instructions  Recorded  Last Taken  Type


 


Carvedilol [Coreg] 12.5 mg PO BID #60 tablet 05/24/16 Unknown Rx


 


Lisinopril/Hydrochlorothiazide 1 tab PO QDAY #30 tablet 05/24/16 Unknown Rx





[Zestoretic 20-25 mg]    


 


amLODIPine [Norvasc] 10 mg PO DAILY #30 tablet 05/24/16 Unknown Rx


 


cloNIDine [Catapres] 0.1 mg PO BID #60 tablet 05/24/16 Unknown Rx


 


hydrALAZINE [Apresoline TAB] 25 mg PO Q8HR #90 tab 08/23/16 Unknown Rx


 


metFORMIN [Glucophage] 500 mg PO BID #60 tablet 02/09/17 Unknown Rx


 


Cyclobenzaprine [Flexeril] 10 mg PO TID PRN #30 tablet 04/22/17 Unknown Rx


 


Naproxen [Naprosyn TAB] 500 mg PO BID #30 tablet 04/22/17 Unknown Rx


 


traMADol [Ultram] 50 mg PO Q4HR PRN #30 tablet 04/22/17 Unknown Rx


 


Amoxicillin [Amoxicillin TAB] 875 mg PO BID #14 tablet 09/18/17 Unknown Rx


 


Ondansetron [Zofran Odt] 4 mg PO Q8HR PRN #14 tab.rapdis 09/18/17 Unknown Rx


 


Prednisone [predniSONE 10 mg 10 mg PO .TAPER #1 tab.ds.pk 09/18/17 Unknown Rx





(6-Day Pack, 21 Tabs)]    


 


traMADol [Ultram 50 MG tab] 50 mg PO Q4HR PRN #14 tablet 09/18/17 Unknown Rx











 Allergies











Allergy/AdvReac Type Severity Reaction Status Date / Time


 


tomato [Tomato] Allergy  Swelling Verified 04/22/17 10:16














Heart Score





- HEART Score


History: Moderately suspicious


EKG: Non-specific


Age: < 45


Risk factors: 1-2 risk factors


Troponin: < normal limit


HEART Score: 3





- Critical Actions


Critical Actions: 0-3 pts:0.9-1.7%risk of adverse cardiac event.Candidate for 

discharge





ED Review of Systems


ROS: 


Stated complaint: CHEST PAIN


Other details as noted in HPI





Comment: All other systems reviewed and negative


Constitutional: denies: chills, fever


Respiratory: shortness of breath.  denies: cough


Cardiovascular: chest pain.  denies: palpitations


Gastrointestinal: denies: nausea, vomiting


Skin: denies: rash, lesions





ED Past Medical Hx





- Past Medical History


Hx Hypertension: Yes


Hx CVA: Yes (MILD LEFT SIDED WEAKNESS)


Hx Congestive Heart Failure: No


Hx Diabetes: Yes


Hx GERD: Yes


Hx Headaches / Migraines: Yes


Hx Asthma: No


Hx COPD: No


Additional medical history: sleep apnea.  chronic back pain





- Surgical History


Additional Surgical History: Right upper extremity surgery secondary to trauma, 

cardiac catheterization





- Social History


Smoking Status: Never Smoker


Substance Use Type: None





- Medications


Home Medications: 


 Home Medications











 Medication  Instructions  Recorded  Confirmed  Last Taken  Type


 


Carvedilol [Coreg] 12.5 mg PO BID #60 tablet 05/24/16  Unknown Rx


 


Lisinopril/Hydrochlorothiazide 1 tab PO QDAY #30 tablet 05/24/16  Unknown Rx





[Zestoretic 20-25 mg]     


 


amLODIPine [Norvasc] 10 mg PO DAILY #30 tablet 05/24/16  Unknown Rx


 


cloNIDine [Catapres] 0.1 mg PO BID #60 tablet 05/24/16  Unknown Rx


 


hydrALAZINE [Apresoline TAB] 25 mg PO Q8HR #90 tab 08/23/16  Unknown Rx


 


metFORMIN [Glucophage] 500 mg PO BID #60 tablet 02/09/17  Unknown Rx


 


Cyclobenzaprine [Flexeril] 10 mg PO TID PRN #30 tablet 04/22/17  Unknown Rx


 


Naproxen [Naprosyn TAB] 500 mg PO BID #30 tablet 04/22/17  Unknown Rx


 


traMADol [Ultram] 50 mg PO Q4HR PRN #30 tablet 04/22/17  Unknown Rx


 


Amoxicillin [Amoxicillin TAB] 875 mg PO BID #14 tablet 09/18/17  Unknown Rx


 


Ondansetron [Zofran Odt] 4 mg PO Q8HR PRN #14 tab.rapdis 09/18/17  Unknown Rx


 


Prednisone [predniSONE 10 mg 10 mg PO .TAPER #1 tab.ds.pk 09/18/17  Unknown Rx





(6-Day Pack, 21 Tabs)]     


 


traMADol [Ultram 50 MG tab] 50 mg PO Q4HR PRN #14 tablet 09/18/17  Unknown Rx














ED Physical Exam





- General


Limitations: No Limitations


General appearance: alert, in no apparent distress





- Head


Head exam: Present: atraumatic, normocephalic





- Eye


Eye exam: Present: normal appearance





- ENT


ENT exam: Present: normal exam





- Neck


Neck exam: Present: normal inspection.  Absent: tenderness, full ROM, 

lymphadenopathy





- Respiratory


Respiratory exam: Present: normal lung sounds bilaterally.  Absent: wheezes, 

rales, rhonchi





- Cardiovascular


Cardiovascular Exam: Present: regular rate, normal rhythm, normal heart sounds





- GI/Abdominal


GI/Abdominal exam: Present: soft.  Absent: tenderness, guarding, rebound, rigid

, mass, bruit, pulsatile mass, hernia





- Extremities Exam


Extremities exam: Present: normal inspection, full ROM





- Back Exam


Back exam: Present: normal inspection.  Absent: CVA tenderness (R), CVA 

tenderness (L)





- Neurological Exam


Neurological exam: Present: alert, oriented X3, CN II-XII intact, normal gait





- Psychiatric


Psychiatric exam: Present: normal affect, normal mood





- Skin


Skin exam: Present: warm, intact, normal color





ED Course


 Vital Signs











  09/18/17 09/18/17 09/18/17





  16:20 17:13 18:32


 


Temperature 98.1 F  98 F


 


Pulse Rate 84 82 77


 


Respiratory 18 17 16





Rate   


 


Blood Pressure 125/80 125/79 129/71





[Left]   


 


O2 Sat by Pulse 98 98 98





Oximetry   














- Reevaluation(s)


Reevaluation #1: 





09/18/17 20:35


Patient is that he is feeling much better inform him about his blood work and 

his CT scan of the brain which is basically negative except for sinusitis.  

Patient does have 2 troponin negative in the ER advised him to follow-up with 

his primary care physician for further managment.





ELIU score





- Eliu Score


Age > 65: (0) No


Aspirin use within the Past 7 Days: (0) No


3 or more CAD Risk Factors: (0) No


2 or more Angina events in past 24 hrs: (1) Yes


Known CAD with more than 50% Stenosis: (0) No


Elevated Cardiac Markers: (0) No


ST Deviation Greater than 0.5mm: (0) No


ELIU Score: 1





ED Medical Decision Making





- Lab Data


Result diagrams: 


 09/18/17 16:23





 09/18/17 16:23


Critical care attestation.: 


If time is entered above; I have spent that time in minutes in the direct care 

of this critically ill patient, excluding procedure time.








ED Disposition


Clinical Impression: 


 Chest pain, Headache





Disposition: DC-01 TO HOME OR SELFCARE


Is pt being admited?: No


Condition: Stable


Instructions:  Chest Pain (ED), Sinusitis (ED)

## 2017-09-19 NOTE — XRAY REPORT
Portable chest:



Chest pain.



The cardiac silhouette appears enlarged when compared to prior studies 

including a comparable exam in April 2016. The left lung base is 

partially obscured by the density of the heart. There is no overt 

vascular congestion in the lungs are not otherwise remarkable.



Impression:



The findings are suspicious for an enlarged cardiac contour despite 

positioning of the exam.



Recommendation:



Repeat standard imaging recommended to her evaluate heart size.

## 2018-05-19 ENCOUNTER — HOSPITAL ENCOUNTER (EMERGENCY)
Dept: HOSPITAL 5 - ED | Age: 43
LOS: 1 days | Discharge: HOME | End: 2018-05-20
Payer: SELF-PAY

## 2018-05-19 DIAGNOSIS — G43.909: ICD-10-CM

## 2018-05-19 DIAGNOSIS — Z91.018: ICD-10-CM

## 2018-05-19 DIAGNOSIS — I10: ICD-10-CM

## 2018-05-19 DIAGNOSIS — Z86.73: ICD-10-CM

## 2018-05-19 DIAGNOSIS — M54.9: ICD-10-CM

## 2018-05-19 DIAGNOSIS — G89.29: ICD-10-CM

## 2018-05-19 DIAGNOSIS — E11.9: ICD-10-CM

## 2018-05-19 DIAGNOSIS — Z87.891: ICD-10-CM

## 2018-05-19 DIAGNOSIS — M79.602: ICD-10-CM

## 2018-05-19 DIAGNOSIS — I16.0: Primary | ICD-10-CM

## 2018-05-19 LAB
BASOPHILS # (AUTO): 0 K/MM3 (ref 0–0.1)
BASOPHILS NFR BLD AUTO: 0.7 % (ref 0–1.8)
BUN SERPL-MCNC: 13 MG/DL (ref 9–20)
BUN/CREAT SERPL: 22 %
CALCIUM SERPL-MCNC: 9.5 MG/DL (ref 8.4–10.2)
EOSINOPHIL # BLD AUTO: 0.2 K/MM3 (ref 0–0.4)
EOSINOPHIL NFR BLD AUTO: 2.6 % (ref 0–4.3)
HCT VFR BLD CALC: 39.1 % (ref 35.5–45.6)
HEMOLYSIS INDEX: 3
HGB BLD-MCNC: 13.4 GM/DL (ref 11.8–15.2)
LYMPHOCYTES # BLD AUTO: 1.9 K/MM3 (ref 1.2–5.4)
LYMPHOCYTES NFR BLD AUTO: 29.8 % (ref 13.4–35)
MCH RBC QN AUTO: 26 PG (ref 28–32)
MCHC RBC AUTO-ENTMCNC: 34 % (ref 32–34)
MCV RBC AUTO: 75 FL (ref 84–94)
MONOCYTES # (AUTO): 0.5 K/MM3 (ref 0–0.8)
MONOCYTES % (AUTO): 7.5 % (ref 0–7.3)
PLATELET # BLD: 299 K/MM3 (ref 140–440)
RBC # BLD AUTO: 5.2 M/MM3 (ref 3.65–5.03)

## 2018-05-19 PROCEDURE — 93005 ELECTROCARDIOGRAM TRACING: CPT

## 2018-05-19 PROCEDURE — 99284 EMERGENCY DEPT VISIT MOD MDM: CPT

## 2018-05-19 PROCEDURE — 71275 CT ANGIOGRAPHY CHEST: CPT

## 2018-05-19 PROCEDURE — 85025 COMPLETE CBC W/AUTO DIFF WBC: CPT

## 2018-05-19 PROCEDURE — 84484 ASSAY OF TROPONIN QUANT: CPT

## 2018-05-19 PROCEDURE — 96374 THER/PROPH/DIAG INJ IV PUSH: CPT

## 2018-05-19 PROCEDURE — 93010 ELECTROCARDIOGRAM REPORT: CPT

## 2018-05-19 PROCEDURE — 80048 BASIC METABOLIC PNL TOTAL CA: CPT

## 2018-05-19 PROCEDURE — 83880 ASSAY OF NATRIURETIC PEPTIDE: CPT

## 2018-05-19 PROCEDURE — 82962 GLUCOSE BLOOD TEST: CPT

## 2018-05-19 PROCEDURE — 36415 COLL VENOUS BLD VENIPUNCTURE: CPT

## 2018-05-19 PROCEDURE — 70450 CT HEAD/BRAIN W/O DYE: CPT

## 2018-05-19 NOTE — EMERGENCY DEPARTMENT REPORT
ED Chest Pain HPI





- General


Chief Complaint: Chest Pain


Stated Complaint: CHEST PAIN


Time Seen by Provider: 18 20:21


Source: patient


Mode of arrival: Wheelchair


Limitations: Physical Limitation





- History of Present Illness


Initial Comments: 





The patient is 42 years old male with history of hypertension, diabetes and 

possible coronary artery disease.  Patient is noncompliant with his medication.

  Patient presented to the ER complaining of left-sided chest pain associated 

with left upper extremity numbness and tingling sensation and headache.  

Patient stated that his symptoms started yesterday.  Patient denied any 

shortness of breath or cough.  Patient denies weakness, neck pain or neck 

stiffness.


MD Complaint: chest pain


-: days(s)


Pain Location: left chest


Pain Radiation: LUE


Severity: moderate


Severity scale (0 -10): 10


Quality: heaviness, sharp


Consistency: constant


Improves With: nothing





- Related Data


 Home Medications











 Medication  Instructions  Recorded  Confirmed  Last Taken


 


Lisinopril/Hydrochlorothiazide 20 mg PO QDAY 18 Unknown





[Zestoretic 20-25 mg]    


 


cloNIDine [Catapres] 0.2 mg PO BID 18 Unknown








 Previous Rx's











 Medication  Instructions  Recorded  Last Taken  Type


 


Carvedilol [Coreg] 12.5 mg PO BID #60 tablet 16 Unknown Rx


 


amLODIPine [Norvasc] 10 mg PO DAILY #30 tablet 16 Unknown Rx


 


metFORMIN [Glucophage] 500 mg PO BID #60 tablet 17 Unknown Rx











 Allergies











Allergy/AdvReac Type Severity Reaction Status Date / Time


 


tomato [Tomato] Allergy  Swelling Verified 18 18:30














Heart Score





- HEART Score


History: Moderately suspicious


EKG: Non-specific


Age: < 45


Risk factors: > 3 risk factors or hx of atherosclerotic disease


Troponin: < normal limit


HEART Score: 4





- Critical Actions


Critical Actions: 4-6 pts:12-16.6% risk of adverse cardiac event. Should be 

admitted





ED Review of Systems


ROS: 


Stated complaint: CHEST PAIN


Other details as noted in HPI





Comment: All other systems reviewed and negative


Constitutional: denies: chills, fever


Respiratory: denies: cough, shortness of breath, SOB with exertion, SOB at rest

, wheezing


Cardiovascular: chest pain


Gastrointestinal: denies: abdominal pain, nausea, vomiting, diarrhea, 

constipation, hematemesis, melena, hematochezia


Genitourinary: denies: urgency, dysuria, frequency, hematuria


Neurological: headache, numbness.  denies: weakness, paresthesias, confusion, 

abnormal gait, vertigo





ED Past Medical Hx





- Past Medical History


Hx Hypertension: Yes


Hx CVA: Yes (MILD LEFT SIDED WEAKNESS)


Hx Congestive Heart Failure: No


Hx Diabetes: Yes


Hx GERD: Yes


Hx Headaches / Migraines: Yes


Hx Asthma: No


Hx COPD: No


Additional medical history: sleep apnea.  chronic back pain





- Surgical History


Additional Surgical History: Right upper extremity surgery secondary to trauma, 

cardiac catheterization





- Social History


Smoking Status: Former Smoker


Substance Use Type: Alcohol





- Medications


Home Medications: 


 Home Medications











 Medication  Instructions  Recorded  Confirmed  Last Taken  Type


 


Carvedilol [Coreg] 12.5 mg PO BID #60 tablet 16 Unknown Rx


 


amLODIPine [Norvasc] 10 mg PO DAILY #30 tablet 16 Unknown Rx


 


metFORMIN [Glucophage] 500 mg PO BID #60 tablet 17 Unknown Rx


 


Lisinopril/Hydrochlorothiazide 20 mg PO QDAY 18 Unknown History





[Zestoretic 20-25 mg]     


 


cloNIDine [Catapres] 0.2 mg PO BID 18 Unknown History














ED Physical Exam





- General


Limitations: Physical Limitation


General appearance: alert, in no apparent distress





- Head


Head exam: Present: atraumatic, normocephalic, normal inspection





- Eye


Eye exam: Present: normal appearance





- ENT


ENT exam: Present: normal exam, normal orophraynx, mucous membranes moist





- Neck


Neck exam: Present: normal inspection, full ROM.  Absent: tenderness, 

meningismus, lymphadenopathy





- Respiratory


Respiratory exam: Present: normal lung sounds bilaterally.  Absent: respiratory 

distress, wheezes, rales, rhonchi, stridor, chest wall tenderness, accessory 

muscle use, decreased breath sounds, prolonged expiratory





- Cardiovascular


Cardiovascular Exam: Present: regular rate, normal rhythm, normal heart sounds





- GI/Abdominal


GI/Abdominal exam: Present: soft, normal bowel sounds.  Absent: distended, 

tenderness, guarding, rebound, rigid, organomegaly, mass, bruit, pulsatile mass

, hernia





- Extremities Exam


Extremities exam: Present: normal inspection, full ROM, normal capillary 

refill.  Absent: tenderness, pedal edema, calf tenderness





- Back Exam


Back exam: Present: normal inspection, full ROM.  Absent: tenderness, CVA 

tenderness (R), CVA tenderness (L), muscle spasm, paraspinal tenderness, 

vertebral tenderness





- Neurological Exam


Neurological exam: Present: alert, oriented X3, CN II-XII intact, normal gait





- Skin


Skin exam: Present: warm, intact, normal color





ED Course


 Vital Signs











  18





  17:10 20:56 21:18


 


Temperature 98.3 F  


 


Pulse Rate 119 H 85 94 H


 


Respiratory 22 16 16





Rate   


 


Blood Pressure 193/133  


 


Blood Pressure 193/133 161/102 145/94





[Right]   


 


O2 Sat by Pulse 99 96 95





Oximetry   














  18





  21:26


 


Temperature 


 


Pulse Rate 


 


Respiratory 





Rate 


 


Blood Pressure 145/94


 


Blood Pressure 





[Right] 


 


O2 Sat by Pulse 





Oximetry 














TIM score





- Tim Score


Age > 65: (0) No


Aspirin use within the Past 7 Days: (0) No


3 or more CAD Risk Factors: (0) No


2 or more Angina events in past 24 hrs: (1) Yes


Known CAD with more than 50% Stenosis: (0) No


Elevated Cardiac Markers: (0) No


ST Deviation Greater than 0.5mm: (0) No


TIM Score: 1





ED Medical Decision Making





- Lab Data


Result diagrams: 


 18 18:22





 18 18:22





- EKG Data


-: EKG Interpreted by Me


EKG shows normal: sinus rhythm


Rate: tachycardia





- EKG Data


Interpretation: no acute changes





- Radiology Data


Radiology results: report reviewed





Referring Physician:   LIBBY TEE


Patient Name:   KEVIN MENDEZ


Patient ID:   [id number]


YOB: 1975


Sex:   Male


Accession:   [id number]


Report Date:   2018


Report Status:   Finalized


Findings


[facility] 


[address] [place] [address] 





Cat Scan Report 


Signed 





Patient: KEVIN MENDEZ MR#: [id number] 


: 1975 Acct:[id number] 


Age/Sex: 42 / M ADM Date: 18 


Loc: ED 


Attending Dr: 








Ordering Physician: LIBBY TEE 


Date of Service: 18 


Procedure(s): CT angio chest 


Accession Number(s): [id number] 





cc: LIBBY TEE 








FINAL REPORT 





EXAM: CT ANGIO CHEST 





HISTORY: CHEST PAIN/HIGH BP, R/O DISSECTION 





COMPARISON: None available. 





TECHNIQUE: Contiguous axial images were obtained. Additional 


sagittal and coronal reformatted images were obtained. 


Administration of IV contrast given per institution protocol. 


Images submitted for interpretation. 100 cc Omnipaque 350. 





FINDINGS: 


Heart normal in size. Thoracic aorta normal in caliber. No 


dissection. Ascending thoracic aorta measures 3.7 centimeters in 


diameter upper limits normal. The descending thoracic aorta 


measures 2.8 centimeters. No pulmonary embolus. No pathologically 


enlarged intrathoracic or axillary lymph nodes. 





Mild linear atelectasis at the lung bases. No dense airspace 


consolidation or pleural effusion. 





Visualized upper abdomen is grossly unremarkable. Tiny hiatal 


hernia. Very mild degenerative changes of the thoracic spine. 





IMPRESSION: 


Thoracic aorta is normal in caliber. No dissection or rupture. No 


pulmonary embolus. 





No focal consolidation or pleural effusion. 











Transcribed By: LMA 


Dictated By: GREGORIA GAGE MD 


Electronically Authenticated By: GREGORIA GAGE MD 


Signed Date/Time: 18 





Referring Physician:   LIBBY TEE


Patient Name:   KEVIN MENDEZ


Patient ID:   [id number]


YOB: 1975


Sex:   Male


Accession:   [id number]


Report Date:   2018


Report Status:   Finalized


Findings


[facility] 


[address] [id number] 





Cat Scan Report 


Signed 





Patient: KEVIN MENDEZ MR#: [id number] 


: 1975 Acct:[id number] 


Age/Sex: 42 / M ADM Date: 18 


Loc: ED 


Attending Dr: 








Ordering Physician: LIBBY TEE 


Date of Service: 18 


Procedure(s): CT head/brain wo con 


Accession Number(s): [id number] 





cc: LIBBY TEE 








FINAL REPORT 





EXAM: CT HEAD/BRAIN WO CON 





HISTORY: headache/high BP 





COMPARISON: CT of the head from 2017. 





TECHNIQUE: Axial images obtained skull base through vertex. 





FINDINGS: 


No acute intracranial hemorrhage, midline shift or pathologic 


extra axial fluid collection. Ventricles and cisterns are normal 


in size and configuration for the patient's age. Gray-white 


differentiation preserved. Calvarium grossly intact. Prominent 


calcification along the falx, benign finding. Ocular globes are 


grossly unremarkable. Prominent retention cyst or polyps in the 


floors of the maxillary sinuses. Mastoid air cells are clear. 


Mild mucosal thickening of the ethmoid air cells. 





IMPRESSION: 


No grossly acute intracranial abnormality. 











Transcribed By: LMA 


Dictated By: GREGORIA GAGE MD 


Electronically Authenticated By: GREGORIA GAGE MD 


Signed Date/Time: 18 











DD/DT: 18 


TD/TT: 18











DD/DT: 18 


TD/TT: 18





- Medical Decision Making





Patient stated that he is feeling much better.  Chest pain completely resolved.

  Patient blood pressure now is 140/92.  Patient workup included a chest CTA 

which she did not show any evidence of aortic dissection or aneurysm.  CT brain 

negative for acute finding.  I advised patient to be compliant with his 

medication and to follow up with his primary care physician in the next 2-3 

days.  I added Lasix to his regimen since he has bilateral lower extremity 

edema.


Critical care time in (mins) excluding proc time.: 0


Critical care attestation.: 


If time is entered above; I have spent that time in minutes in the direct care 

of this critically ill patient, excluding procedure time.








ED Disposition


Clinical Impression: 


 Chest pain, Hypertensive urgency, malignant, Left arm pain





Disposition: - TO HOME OR SELFCARE


Is pt being admited?: No


Condition: Stable


Instructions:  Chest Pain (ED), Hypertensive Crisis (ED)


Referrals: 


PRIMARY CARE,MD [Primary Care Provider] - 3-5 Days

## 2018-05-19 NOTE — CAT SCAN REPORT
FINAL REPORT



EXAM:  CT HEAD/BRAIN WO CON



HISTORY:  headache/high BP 



COMPARISON:  CT of the head from September 2017. 



TECHNIQUE:  Axial images obtained skull base through vertex.



FINDINGS:  

No acute intracranial hemorrhage, midline shift or pathologic

extra axial fluid collection. Ventricles and cisterns are normal

in size and configuration for the patient's age. Gray-white

differentiation preserved. Calvarium grossly intact. Prominent

calcification along the falx, benign finding. Ocular globes are

grossly unremarkable. Prominent retention cyst or polyps in the

floors of the maxillary sinuses. Mastoid air cells are clear.

Mild mucosal thickening of the ethmoid air cells. 



IMPRESSION:  

No grossly acute intracranial abnormality.

## 2018-05-19 NOTE — CAT SCAN REPORT
FINAL REPORT



EXAM:  CT ANGIO CHEST



HISTORY:  CHEST PAIN/HIGH BP, R/O DISSECTION 



COMPARISON:  None available. 



TECHNIQUE:  Contiguous axial images were obtained. Additional

sagittal and coronal reformatted images were obtained.

Administration of IV contrast given per institution protocol.

Images submitted for interpretation. 100 cc Omnipaque 350. 



FINDINGS:  

Heart normal in size. Thoracic aorta normal in caliber. No

dissection. Ascending thoracic aorta measures 3.7 centimeters in

diameter upper limits normal. The descending thoracic aorta

measures 2.8 centimeters. No pulmonary embolus. No pathologically

enlarged intrathoracic or axillary lymph nodes. 



Mild linear atelectasis at the lung bases. No dense airspace

consolidation or pleural effusion. 



Visualized upper abdomen is grossly unremarkable. Tiny hiatal

hernia. Very mild degenerative changes of the thoracic spine. 



IMPRESSION:  

Thoracic aorta is normal in caliber. No dissection or rupture. No

pulmonary embolus. 



No focal consolidation or pleural effusion.

## 2018-05-20 VITALS — SYSTOLIC BLOOD PRESSURE: 144 MMHG | DIASTOLIC BLOOD PRESSURE: 92 MMHG

## 2018-05-23 ENCOUNTER — HOSPITAL ENCOUNTER (OUTPATIENT)
Dept: HOSPITAL 5 - XRAY | Age: 43
Discharge: HOME | End: 2018-05-23
Attending: INTERNAL MEDICINE
Payer: COMMERCIAL

## 2018-05-23 DIAGNOSIS — F41.9: ICD-10-CM
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
## 2018-05-23 DIAGNOSIS — S39.82XA: ICD-10-CM

## 2018-05-23 DIAGNOSIS — M19.012: ICD-10-CM
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
## 2018-05-23 DIAGNOSIS — F32.9: ICD-10-CM

## 2018-05-23 DIAGNOSIS — S40.012A: ICD-10-CM

## 2018-05-23 DIAGNOSIS — S49.92XA: Primary | ICD-10-CM
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
## 2018-05-23 PROCEDURE — 72100 X-RAY EXAM L-S SPINE 2/3 VWS: CPT

## 2018-05-23 NOTE — XRAY REPORT
FINAL REPORT



PROCEDURE:  XR SPINE LUMBOSACRAL 2-3V



TECHNIQUE:  Lumbar spine radiographs, including AP, lateral, and

lumbosacral spot views. CPT 62592 







HISTORY:  SPINAL CORD INJURY, NECK PROBLEMS, LEFT SHOULDER

INJURY,CONTUSION 



COMPARISON:  No prior studies are available for comparison.



FINDINGS:  

Alignment: Normal.



Vertebral body heights/Disk spaces: Normal.



Fracture(s): None.



Facets: Normal.



Bone mineralization: Normal.



IMPRESSION:  

Normal Examination.

## 2018-05-23 NOTE — XRAY REPORT
XRAY LEFT SHOULDER THREE VIEWS: 05/23/18 09:10:00





CLINICAL: Spinal cord injury.  Left shoulder injury.



COMPARISON: 04/22/17



FINDINGS: No fracture or dislocation.  Mild glenohumeral joint 

arthritis and mild degenerative change at the greater tuberosity of the 

humerus.  Normal AC joint.  The soft tissues are normal.



IMPRESSION: No acute findings.  Mild glenohumeral joint arthritis and 

mild degenerative change at the rotator cuff insertion.

## 2018-06-19 ENCOUNTER — HOSPITAL ENCOUNTER (EMERGENCY)
Dept: HOSPITAL 5 - ED | Age: 43
Discharge: HOME | End: 2018-06-19
Payer: COMMERCIAL

## 2018-06-19 VITALS — DIASTOLIC BLOOD PRESSURE: 70 MMHG | SYSTOLIC BLOOD PRESSURE: 124 MMHG

## 2018-06-19 DIAGNOSIS — G43.909: ICD-10-CM

## 2018-06-19 DIAGNOSIS — M54.9: ICD-10-CM

## 2018-06-19 DIAGNOSIS — Z91.018: ICD-10-CM

## 2018-06-19 DIAGNOSIS — E11.9: ICD-10-CM

## 2018-06-19 DIAGNOSIS — G47.30: ICD-10-CM

## 2018-06-19 DIAGNOSIS — J81.1: Primary | ICD-10-CM

## 2018-06-19 DIAGNOSIS — I10: ICD-10-CM

## 2018-06-19 DIAGNOSIS — G89.29: ICD-10-CM

## 2018-06-19 DIAGNOSIS — K21.9: ICD-10-CM

## 2018-06-19 DIAGNOSIS — G60.9: ICD-10-CM

## 2018-06-19 PROCEDURE — 99282 EMERGENCY DEPT VISIT SF MDM: CPT

## 2018-06-19 NOTE — EMERGENCY DEPARTMENT REPORT
ED Extremity Problem HPI





- General


Chief complaint: Extremity Injury, Lower


Stated complaint: BILATERAL FOOT SWELLING


Time Seen by Provider: 06/19/18 11:42


Source: patient


Mode of arrival: Ambulatory


Limitations: No Limitations





- History of Present Illness


Initial comments: 





Patient is a 42-year-old American male who is presenting with leg swelling.  

Patient states for the last week he has had some increased swelling to the 

bilateral lower extremities.  Patient also feels an electric sensation 

sometimes in his legs.  Patient is a diabetic.  Patient states he has some mild 

shortness of breath with exertion with no chest pain.  Patient was seen last 

month for same and started on Lasix states he has some slight improvement but 

the swelling has started to return.  Patient denies any fevers chills nausea 

vomiting chest pain at this time.





- Related Data


 Home Medications











 Medication  Instructions  Recorded  Confirmed  Last Taken


 


Lisinopril/Hydrochlorothiazide 20 mg PO QDAY 05/19/18 05/19/18 Unknown





[Zestoretic 20-25 mg]    


 


cloNIDine [Catapres] 0.2 mg PO BID 05/19/18 05/19/18 Unknown








 Previous Rx's











 Medication  Instructions  Recorded  Last Taken  Type


 


Carvedilol [Coreg] 12.5 mg PO BID #60 tablet 05/24/16 Unknown Rx


 


amLODIPine [Norvasc] 10 mg PO DAILY #30 tablet 05/24/16 Unknown Rx


 


metFORMIN [Glucophage] 500 mg PO BID #60 tablet 02/09/17 Unknown Rx


 


Lisinopril/Hydrochlorothiazide 1 tab PO QDAY #30 tab 05/19/18 Unknown Rx





[Zestoretic 20-25 mg]    


 


Ondansetron [Zofran Odt] 4 mg PO Q8HR PRN #14 tab.rapdis 05/19/18 Unknown Rx


 


Furosemide [Lasix TAB] 40 mg PO QDAY #10 tablet 06/19/18 Unknown Rx


 


traMADol [Ultram 50 MG tab] 50 mg PO Q4HR PRN #10 tablet 06/19/18 Unknown Rx











 Allergies











Allergy/AdvReac Type Severity Reaction Status Date / Time


 


tomato [Tomato] Allergy  Swelling Verified 06/19/18 10:28














ED Review of Systems


ROS: 


Stated complaint: BILATERAL FOOT SWELLING


Other details as noted in HPI





Comment: All other systems reviewed and negative





ED Past Medical Hx





- Past Medical History


Hx Hypertension: Yes


Hx CVA: Yes (MILD LEFT SIDED WEAKNESS)


Hx Congestive Heart Failure: No


Hx Diabetes: Yes


Hx GERD: Yes


Hx Headaches / Migraines: Yes


Hx Asthma: No


Hx COPD: No


Additional medical history: sleep apnea.  chronic back pain





- Surgical History


Additional Surgical History: Right upper extremity surgery secondary to trauma, 

cardiac catheterization





- Social History


Smoking Status: Never Smoker


Substance Use Type: None





- Medications


Home Medications: 


 Home Medications











 Medication  Instructions  Recorded  Confirmed  Last Taken  Type


 


Carvedilol [Coreg] 12.5 mg PO BID #60 tablet 05/24/16 05/19/18 Unknown Rx


 


amLODIPine [Norvasc] 10 mg PO DAILY #30 tablet 05/24/16 05/19/18 Unknown Rx


 


metFORMIN [Glucophage] 500 mg PO BID #60 tablet 02/09/17 05/19/18 Unknown Rx


 


Lisinopril/Hydrochlorothiazide 1 tab PO QDAY #30 tab 05/19/18  Unknown Rx





[Zestoretic 20-25 mg]     


 


Lisinopril/Hydrochlorothiazide 20 mg PO QDAY 05/19/18 05/19/18 Unknown History





[Zestoretic 20-25 mg]     


 


Ondansetron [Zofran Odt] 4 mg PO Q8HR PRN #14 tab.rapdis 05/19/18  Unknown Rx


 


cloNIDine [Catapres] 0.2 mg PO BID 05/19/18 05/19/18 Unknown History


 


Furosemide [Lasix TAB] 40 mg PO QDAY #10 tablet 06/19/18  Unknown Rx


 


traMADol [Ultram 50 MG tab] 50 mg PO Q4HR PRN #10 tablet 06/19/18  Unknown Rx














ED Physical Exam





- General


Limitations: No Limitations


General appearance: alert, in no apparent distress





- Head


Head exam: Present: atraumatic, normocephalic





- Eye


Eye exam: Present: normal appearance





- ENT


ENT exam: Present: mucous membranes moist





- Neck


Neck exam: Present: normal inspection





- Respiratory


Respiratory exam: Present: normal lung sounds bilaterally.  Absent: respiratory 

distress, wheezes, rales, rhonchi





- Cardiovascular


Cardiovascular Exam: Present: regular rate, normal rhythm.  Absent: systolic 

murmur, diastolic murmur, rubs, gallop





- GI/Abdominal


GI/Abdominal exam: Present: soft, normal bowel sounds.  Absent: distended, 

tenderness, guarding, rebound





- Rectal


Rectal exam: Present: deferred





- Extremities Exam


Extremities exam: Present: normal inspection, full ROM, pedal edema.  Absent: 

tenderness





- Back Exam


Back exam: Present: normal inspection





- Neurological Exam


Neurological exam: Present: alert, oriented X3





- Psychiatric


Psychiatric exam: Present: normal affect, normal mood





- Skin


Skin exam: Present: warm, dry, intact, normal color.  Absent: rash





ED Course





 Vital Signs











  06/19/18





  10:28


 


Temperature 97.9 F


 


Pulse Rate 81


 


Respiratory 16





Rate 


 


Blood Pressure 129/72


 


O2 Sat by Pulse 96





Oximetry 














ED Medical Decision Making





- Medical Decision Making





Lungs clear do not feel as though he has any pulmonary edema at this time.  He 

does not appear to be any distress.  Patient does have some dependent edema 

which be restarted on Lasix 40 mg daily.  Patient also was given a primary care 

physician for follow-up


Critical care attestation.: 


If time is entered above; I have spent that time in minutes in the direct care 

of this critically ill patient, excluding procedure time.








ED Disposition


Clinical Impression: 


 Dependent edema





Peripheral neuropathy


Qualifiers:


 Peripheral neuropathy type: idiopathic neuropathy, unspecified Qualified Code(s

): G60.9 - Hereditary and idiopathic neuropathy, unspecified





Disposition: DC-01 TO HOME OR SELFCARE


Is pt being admited?: No


Does the pt Need Aspirin: No


Condition: Stable


Instructions:  Peripheral Neuropathy (ED), Leg Edema (ED)


Prescriptions: 


Furosemide [Lasix TAB] 40 mg PO QDAY #10 tablet


traMADol [Ultram 50 MG tab] 50 mg PO Q4HR PRN #10 tablet


 PRN Reason: Pain


Referrals: 


PR[facility] CARE,MD [Primary Care Provider] - 3-5 Days

## 2019-04-05 ENCOUNTER — HOSPITAL ENCOUNTER (EMERGENCY)
Dept: HOSPITAL 5 - ED | Age: 44
Discharge: HOME | End: 2019-04-05
Payer: SELF-PAY

## 2019-04-05 VITALS — SYSTOLIC BLOOD PRESSURE: 109 MMHG | DIASTOLIC BLOOD PRESSURE: 74 MMHG

## 2019-04-05 DIAGNOSIS — A08.4: ICD-10-CM

## 2019-04-05 DIAGNOSIS — E11.9: ICD-10-CM

## 2019-04-05 DIAGNOSIS — K44.9: ICD-10-CM

## 2019-04-05 DIAGNOSIS — G43.909: ICD-10-CM

## 2019-04-05 DIAGNOSIS — I10: ICD-10-CM

## 2019-04-05 DIAGNOSIS — K21.9: Primary | ICD-10-CM

## 2019-04-05 LAB
ALBUMIN SERPL-MCNC: 4.3 G/DL (ref 3.9–5)
ALT SERPL-CCNC: 16 UNITS/L (ref 7–56)
BACTERIA #/AREA URNS HPF: (no result) /HPF
BASOPHILS # (AUTO): 0 K/MM3 (ref 0–0.1)
BASOPHILS NFR BLD AUTO: 0.5 % (ref 0–1.8)
BILIRUB UR QL STRIP: (no result)
BLOOD UR QL VISUAL: (no result)
BUN SERPL-MCNC: 17 MG/DL (ref 9–20)
BUN/CREAT SERPL: 13 %
CALCIUM SERPL-MCNC: 9.8 MG/DL (ref 8.4–10.2)
EOSINOPHIL # BLD AUTO: 0.1 K/MM3 (ref 0–0.4)
EOSINOPHIL NFR BLD AUTO: 2.1 % (ref 0–4.3)
HCT VFR BLD CALC: 36.3 % (ref 35.5–45.6)
HEMOLYSIS INDEX: 3
HGB BLD-MCNC: 12.1 GM/DL (ref 11.8–15.2)
LYMPHOCYTES # BLD AUTO: 1.6 K/MM3 (ref 1.2–5.4)
LYMPHOCYTES NFR BLD AUTO: 30.5 % (ref 13.4–35)
MCHC RBC AUTO-ENTMCNC: 33 % (ref 32–34)
MCV RBC AUTO: 78 FL (ref 84–94)
MONOCYTES # (AUTO): 0.5 K/MM3 (ref 0–0.8)
MONOCYTES % (AUTO): 8.4 % (ref 0–7.3)
MUCOUS THREADS #/AREA URNS HPF: (no result) /HPF
PH UR STRIP: 5 [PH] (ref 5–7)
PLATELET # BLD: 334 K/MM3 (ref 140–440)
PROT UR STRIP-MCNC: (no result) MG/DL
RBC # BLD AUTO: 4.64 M/MM3 (ref 3.65–5.03)
RBC #/AREA URNS HPF: 3 /HPF (ref 0–6)
UROBILINOGEN UR-MCNC: < 2 MG/DL (ref ?–2)
WBC #/AREA URNS HPF: 4 /HPF (ref 0–6)

## 2019-04-05 PROCEDURE — 71046 X-RAY EXAM CHEST 2 VIEWS: CPT

## 2019-04-05 PROCEDURE — 99284 EMERGENCY DEPT VISIT MOD MDM: CPT

## 2019-04-05 PROCEDURE — 85025 COMPLETE CBC W/AUTO DIFF WBC: CPT

## 2019-04-05 PROCEDURE — 36415 COLL VENOUS BLD VENIPUNCTURE: CPT

## 2019-04-05 PROCEDURE — 82962 GLUCOSE BLOOD TEST: CPT

## 2019-04-05 PROCEDURE — 74177 CT ABD & PELVIS W/CONTRAST: CPT

## 2019-04-05 PROCEDURE — 80053 COMPREHEN METABOLIC PANEL: CPT

## 2019-04-05 PROCEDURE — 83690 ASSAY OF LIPASE: CPT

## 2019-04-05 PROCEDURE — 96374 THER/PROPH/DIAG INJ IV PUSH: CPT

## 2019-04-05 PROCEDURE — 96372 THER/PROPH/DIAG INJ SC/IM: CPT

## 2019-04-05 PROCEDURE — 93010 ELECTROCARDIOGRAM REPORT: CPT

## 2019-04-05 PROCEDURE — 84484 ASSAY OF TROPONIN QUANT: CPT

## 2019-04-05 PROCEDURE — 93005 ELECTROCARDIOGRAM TRACING: CPT

## 2019-04-05 PROCEDURE — 81001 URINALYSIS AUTO W/SCOPE: CPT

## 2019-04-05 PROCEDURE — 96375 TX/PRO/DX INJ NEW DRUG ADDON: CPT

## 2019-04-05 NOTE — EMERGENCY DEPARTMENT REPORT
Chief Complaint: Chest Pain


Stated Complaint: CHEST PAIN/ABD PAIN/BACK/HEAD


Time Seen by Provider: 04/05/19 12:36





- HPI


History of Present Illness: 





r side cp rad to r neck





[place] [address]


normal cath 2013





[facility]


htn


dm


obese


cig


hpld











- Exam


Vital Signs: 


                                   Vital Signs











  04/05/19 04/05/19





  12:22 12:36


 


Temperature 97.8 F 97.8 F


 


Pulse Rate 79 18 L


 


Respiratory 16 20





Rate  


 


Blood Pressure  109/74


 


Blood Pressure 109/74 





[Right]  


 


O2 Sat by Pulse 98 98





Oximetry  











MSE screening note: 


Focused history and physical exam performed.


Due to findings the following was ordered:











ED Disposition for MSE


Condition: Stable

## 2019-04-05 NOTE — CAT SCAN REPORT
CT ABDOMEN AND PELVIS WITH CONTRAST



INDICATION: Abdominal pain for one week.  Diarrhea, nausea.



COMPARISON: None similar.



FINDINGS: Abdomen and pelvis CT performed following intravenous 

administration of 100 cc of Omnipaque 300.



LUNG BASES: Slight cardiomegaly.  Mild nonspecific distal esophageal 

wall prominence/thickening, not excluded for gastroesophageal reflux 

and/or hiatal hernia, amongst others in this patient with approximately 

1 cm uncomplicated GE junction diverticulum noted on 8/14/2010 upper GI 

exam.



ABDOMEN: Right hepatic lobe approximately 21 cm in midclavicular 

length.  Left hepatic lobe tip also crosses the midline into the left 

upper quadrant.  Otherwise grossly unremarkable unenhanced liver, 

spleen, gallbladder, pancreas, right adrenal, nonaneurysmal abdominal 

aorta, IVC and non-hydronephrotic kidneys.  A 1.2 cm right interpolar 

renal cortical cyst, axial image 37, series 4.  Subtle 0.8 cm left 

adrenal apical nodular prominence not excluded, axial image 61, series 

2.  No ascites or size significant adenopathy with few small, 

subcentimeter mesenteric and retroperitoneal lymph nodes.  Nonopacified 

GI tract evaluation limited, though grossly nonobstructive.  Normal 

appendix.  Tiny fat containing umbilical hernia with a transverse neck 

of 0.7 cm.



PELVIS: Somewhat delayed renal contrast excretion without collecting 

system or urinary bladder contrast noted approximately 8 minutes post 

injection.  Tiny prostatic calcification with grossly unremarkable 

seminal vesicles and the rectosigmoid.  No free fluid or significant 

adenopathy.



No focal aggressive osseous lesions.



CONCLUSION: No definite acute CT abnormality, though various other 

findings, including somewhat prominent/enlarged liver, delayed renal 

contrast excretion and distal esophageal prominence/thickening noted, 

amongst others, as described.  Please correlate.



Thank you for the opportunity to participate in this patient's care.

## 2019-04-05 NOTE — EMERGENCY DEPARTMENT REPORT
ED Abdominal Pain HPI





- General


Chief Complaint: Chest Pain


Stated Complaint: CHEST PAIN/ABD PAIN/BACK/HEAD


Time Seen by Provider: 19 12:36


Source: patient


Mode of arrival: Ambulatory


Limitations: No Limitations





- History of Present Illness


Initial Comments: 





Patient is a 43-year-old American male who is presenting with 1 week of 

epigastric and chest discomfort.  Patient's has some nausea without vomiting.  

Patient states this burning sharp pain.  This is been present for approximately 

a week.  Vision also states that for the past 3 days has some generalized 

abdominal crampiness with diarrhea as watery and nonbloody.  Patient states the 

diarrhea is new and is has not experienced this in recent months.  Patient 

states that the epigastric discomfort is intermittent he's had had this in the 

past.  Patient denies any fevers chills cough cold congestion at this time.


Severity scale (0 -10): 5





- Related Data


                                Home Medications











 Medication  Instructions  Recorded  Confirmed  Last Taken


 


Lisinopril/Hydrochlorothiazide 20 mg PO QDAY 18 Unknown





[Zestoretic 20-25 mg]    


 


cloNIDine [Catapres] 0.2 mg PO BID 18 Unknown








                                  Previous Rx's











 Medication  Instructions  Recorded  Last Taken  Type


 


Carvedilol [Coreg] 12.5 mg PO BID #60 tablet 16 Unknown Rx


 


amLODIPine [Norvasc] 10 mg PO DAILY #30 tablet 16 Unknown Rx


 


metFORMIN [Glucophage] 500 mg PO BID #60 tablet 17 Unknown Rx


 


Lisinopril/Hydrochlorothiazide 1 tab PO QDAY #30 tab 18 Unknown Rx





[Zestoretic 20-25 mg]    


 


Ondansetron [Zofran Odt] 4 mg PO Q8HR PRN #14 tab.rapdis 18 Unknown Rx


 


Furosemide [Lasix TAB] 40 mg PO QDAY #10 tablet 18 Unknown Rx


 


traMADol [Ultram 50 MG tab] 50 mg PO Q4HR PRN #10 tablet 18 Unknown Rx


 


Amoxicillin/Potassium Clav 1 each PO BID #14 tablet 18 Unknown Rx





[Augmentin 875-125 Tablet]    


 


Fluticasone [Flonase] 1 spray NS QDAY #1 bottle 18 Unknown Rx


 


Ibuprofen [Motrin] 600 mg PO Q8H PRN #20 tablet 18 Unknown Rx


 


traMADol [Ultram] 50 mg PO Q6HR PRN #10 tablet 18 Unknown Rx


 


Dicyclomine [Bentyl] 20 mg PO QID #10 tablet 19 Unknown Rx


 


Diphenoxylate/Atropine [Lomotil] 1 tab PO Q4H PRN #10 tablet 19 Unknown Rx


 


Famotidine [Pepcid] 40 mg PO QHS #10 tablet 19 Unknown Rx


 


Ondansetron [Zofran Odt] 4 mg PO Q8HR #10 tab.rapdis 19 Unknown Rx


 


Pantoprazole [Protonix] 40 mg PO QDAY #30 tablet 19 Unknown Rx


 


traMADol [Ultram] 50 mg PO Q6HR PRN #12 tablet 19 Unknown Rx











                                    Allergies











Allergy/AdvReac Type Severity Reaction Status Date / Time


 


tomato [Tomato] Allergy  Swelling Verified 18 10:28














ED Review of Systems


ROS: 


Stated complaint: CHEST PAIN/ABD PAIN/BACK/HEAD


Other details as noted in HPI





Comment: All other systems reviewed and negative





ED Past Medical Hx





- Past Medical History


Previous Medical History?: Yes


Hx Hypertension: Yes


Hx CVA: Yes (MILD LEFT SIDED WEAKNESS)


Hx Congestive Heart Failure: No


Hx Diabetes: Yes


Hx GERD: Yes


Hx Headaches / Migraines: Yes


Hx Asthma: No


Hx COPD: No


Additional medical history: sleep apnea.  chronic back pain





- Surgical History


Past Surgical History?: Yes


Additional Surgical History: Right upper extremity surgery secondary to trauma, 

cardiac catheterization





- Social History


Smoking Status: Never Smoker


Substance Use Type: None





- Medications


Home Medications: 


                                Home Medications











 Medication  Instructions  Recorded  Confirmed  Last Taken  Type


 


Carvedilol [Coreg] 12.5 mg PO BID #60 tablet 16 Unknown Rx


 


amLODIPine [Norvasc] 10 mg PO DAILY #30 tablet 16 Unknown Rx


 


metFORMIN [Glucophage] 500 mg PO BID #60 tablet 17 Unknown Rx


 


Lisinopril/Hydrochlorothiazide 1 tab PO QDAY #30 tab 18  Unknown Rx





[Zestoretic 20-25 mg]     


 


Lisinopril/Hydrochlorothiazide 20 mg PO QDAY 18 Unknown History





[Zestoretic 20-25 mg]     


 


Ondansetron [Zofran Odt] 4 mg PO Q8HR PRN #14 tab.rapdis 18  Unknown Rx


 


cloNIDine [Catapres] 0.2 mg PO BID 18 Unknown History


 


Furosemide [Lasix TAB] 40 mg PO QDAY #10 tablet 18  Unknown Rx


 


traMADol [Ultram 50 MG tab] 50 mg PO Q4HR PRN #10 tablet 18  Unknown Rx


 


Amoxicillin/Potassium Clav 1 each PO BID #14 tablet 18  Unknown Rx





[Augmentin 875-125 Tablet]     


 


Fluticasone [Flonase] 1 spray NS QDAY #1 bottle 18  Unknown Rx


 


Ibuprofen [Motrin] 600 mg PO Q8H PRN #20 tablet 18  Unknown Rx


 


traMADol [Ultram] 50 mg PO Q6HR PRN #10 tablet 18  Unknown Rx


 


Dicyclomine [Bentyl] 20 mg PO QID #10 tablet 19  Unknown Rx


 


Diphenoxylate/Atropine [Lomotil] 1 tab PO Q4H PRN #10 tablet 19  Unknown 

Rx


 


Famotidine [Pepcid] 40 mg PO QHS #10 tablet 19  Unknown Rx


 


Ondansetron [Zofran Odt] 4 mg PO Q8HR #10 tab.rapdis 19  Unknown Rx


 


Pantoprazole [Protonix] 40 mg PO QDAY #30 tablet 19  Unknown Rx


 


traMADol [Ultram] 50 mg PO Q6HR PRN #12 tablet 19  Unknown Rx














ED Physical Exam





- General


Limitations: No Limitations


General appearance: alert, in no apparent distress





- Head


Head exam: Present: atraumatic, normocephalic





- Eye


Eye exam: Present: normal appearance, PERRL, EOMI





- ENT


ENT exam: Present: mucous membranes moist





- Neck


Neck exam: Present: normal inspection





- Respiratory


Respiratory exam: Present: normal lung sounds bilaterally.  Absent: respiratory 

distress, wheezes, rales, rhonchi, stridor





- Cardiovascular


Cardiovascular Exam: Present: regular rate, normal rhythm, normal heart sounds. 

 Absent: systolic murmur, diastolic murmur, rubs, gallop





- GI/Abdominal


GI/Abdominal exam: Present: soft, tenderness (epigastric), normal bowel sounds. 

 Absent: distended, guarding, rebound





- Rectal


Rectal exam: Present: deferred





- Extremities Exam


Extremities exam: Present: normal inspection





- Back Exam


Back exam: Present: normal inspection





- Neurological Exam


Neurological exam: Present: alert, oriented X3





- Psychiatric


Psychiatric exam: Present: normal affect, normal mood





- Skin


Skin exam: Present: warm, dry, intact, normal color.  Absent: rash





ED Course





                                   Vital Signs











  19





  12:22 12:36 13:36


 


Temperature 97.8 F 97.8 F 


 


Pulse Rate 79 18 L 


 


Respiratory 16 20 18





Rate   


 


Blood Pressure  109/74 


 


Blood Pressure 109/74  





[Right]   


 


O2 Sat by Pulse 98 98 





Oximetry   














ED Medical Decision Making





- Lab Data


Result diagrams: 


                                 19 12:43





                                 19 12:43








Labs











  19





  12:43 12:43 12:47


 


WBC  5.4  


 


RBC  4.64  


 


Hgb  12.1  


 


Hct  36.3  


 


MCV  78 L  


 


MCH  26 L  


 


MCHC  33  


 


RDW  15.6 H  


 


Plt Count  334  


 


Lymph % (Auto)  30.5  


 


Mono % (Auto)  8.4 H  


 


Eos % (Auto)  2.1  


 


Baso % (Auto)  0.5  


 


Lymph #  1.6  


 


Mono #  0.5  


 


Eos #  0.1  


 


Baso #  0.0  


 


Seg Neutrophils %  58.5  


 


Seg Neutrophils #  3.2  


 


Sodium   132 L 


 


Potassium   4.3 


 


Chloride   97.8 L 


 


Carbon Dioxide   23 


 


Anion Gap   16 


 


BUN   17 


 


Creatinine   1.3 


 


Estimated GFR   > 60 


 


BUN/Creatinine Ratio   13 


 


Glucose   243 H 


 


POC Glucose    227 H


 


Calcium   9.8 


 


Total Bilirubin   0.30 


 


AST   14 


 


ALT   16 


 


Alkaline Phosphatase   61 


 


Troponin T   < 0.010 


 


Total Protein   7.9 


 


Albumin   4.3 


 


Albumin/Globulin Ratio   1.2 


 


Lipase   49 


 


Urine Color   


 


Urine Turbidity   


 


Urine pH   


 


Ur Specific Gravity   


 


Urine Protein   


 


Urine Glucose (UA)   


 


Urine Ketones   


 


Urine Blood   


 


Urine Nitrite   


 


Urine Bilirubin   


 


Urine Urobilinogen   


 


Ur Leukocyte Esterase   


 


Urine WBC (Auto)   


 


Urine RBC (Auto)   


 


Urine Bacteria (Auto)   


 


Urine Mucus   














  19





  13:33


 


WBC 


 


RBC 


 


Hgb 


 


Hct 


 


MCV 


 


MCH 


 


MCHC 


 


RDW 


 


Plt Count 


 


Lymph % (Auto) 


 


Mono % (Auto) 


 


Eos % (Auto) 


 


Baso % (Auto) 


 


Lymph # 


 


Mono # 


 


Eos # 


 


Baso # 


 


Seg Neutrophils % 


 


Seg Neutrophils # 


 


Sodium 


 


Potassium 


 


Chloride 


 


Carbon Dioxide 


 


Anion Gap 


 


BUN 


 


Creatinine 


 


Estimated GFR 


 


BUN/Creatinine Ratio 


 


Glucose 


 


POC Glucose 


 


Calcium 


 


Total Bilirubin 


 


AST 


 


ALT 


 


Alkaline Phosphatase 


 


Troponin T 


 


Total Protein 


 


Albumin 


 


Albumin/Globulin Ratio 


 


Lipase 


 


Urine Color  Yellow


 


Urine Turbidity  Slightly-cloudy


 


Urine pH  5.0


 


Ur Specific Gravity  1.015


 


Urine Protein  <15 mg/dl


 


Urine Glucose (UA)  >=500


 


Urine Ketones  Neg


 


Urine Blood  Neg


 


Urine Nitrite  Neg


 


Urine Bilirubin  Neg


 


Urine Urobilinogen  < 2.0


 


Ur Leukocyte Esterase  Neg


 


Urine WBC (Auto)  4.0


 


Urine RBC (Auto)  3.0


 


Urine Bacteria (Auto)  2+


 


Urine Mucus  Few














- EKG Data


-: EKG Interpreted by Me


EKG shows normal: sinus rhythm, axis, intervals, QRS complexes, ST-T waves


Rate: normal





- EKG Data


Interpretation: normal EKG





- Radiology Data





[facility] 


[address] [place] [address] 





Cat Scan Report 


Signed 





Patient: KEVIN MENDEZ MR#: [id number] 





: 1975 Acct:[id number] 





Age/Sex: 43 / M ADM Date: 19 





Loc: ED 


Attending Dr: 








Ordering Physician: BROCK LUCERO MD 


Date of Service: 19 


Procedure(s): CT abdomen pelvis w con 


Accession Number(s): [id number] 





cc: BROCK LUCERO MD 








CT ABDOMEN AND PELVIS WITH CONTRAST 





INDICATION: Abdominal pain for one week. Diarrhea, nausea. 





COMPARISON: None similar. 





FINDINGS: Abdomen and pelvis CT performed following intravenous 


administration of 100 cc of Omnipaque 300. 





LUNG BASES: Slight cardiomegaly. Mild nonspecific distal esophageal 


wall prominence/thickening, not excluded for gastroesophageal reflux 


and/or hiatal hernia, amongst others in this patient with approximately 


1 cm uncomplicated GE junction diverticulum noted on 2010 upper GI 


exam. 





ABDOMEN: Right hepatic lobe approximately 21 cm in midclavicular 


length. Left hepatic lobe tip also crosses the midline into the left


upper quadrant. Otherwise grossly unremarkable unenhanced liver, 


spleen, gallbladder, pancreas, right adrenal, nonaneurysmal abdominal 


aorta, IVC and non-hydronephrotic kidneys. A 1.2 cm right interpolar 


renal cortical cyst, axial image 37, series 4. Subtle 0.8 cm left 


adrenal apical nodular prominence not excluded, axial image 61, series 


2. No ascites or size significant adenopathy with few small, 


subcentimeter mesenteric and retroperitoneal lymph nodes. Nonopacified 


GI tract evaluation limited, though grossly nonobstructive. Normal 


appendix. Tiny fat containing umbilical hernia with a transverse neck 


of 0.7 cm. 





PELVIS: Somewhat delayed renal contrast excretion without collecting 


system or urinary bladder contrast noted approximately 8 minutes post 


injection. Tiny prostatic calcification with grossly unremarkable 


seminal vesicles and the rectosigmoid. No free fluid or significant 


adenopathy. 





No focal aggressive osseous lesions. 





CONCLUSION: No definite acute CT abnormality, though various other 


findings, including somewhat prominent/enlarged liver, delayed renal 


contrast excretion and distal esophageal prominence/thickening noted, 


amongst others, as described. Please correlate. 





Thank you for the opportunity to participate in this patient's care. 





Transcribed By: RS 


Dictated By: GIA HO MD 


Electronically Authenticated By: GIA HO MD 


Signed Date/Time: 19 











DD/DT: 19 


TD/TT: 19





- Medical Decision Making





Patient likely with GERD secondary to a hiatal hernia.  Patient also likely with

a viral 


Critical care attestation.: 


If time is entered above; I have spent that time in minutes in the direct care 

of this critically ill patient, excluding procedure time.








ED Disposition


Clinical Impression: 


 GERD (gastroesophageal reflux disease), Hiatal hernia, Viral enteritis





Disposition: - TO HOME OR SELFCARE


Is pt being admited?: No


Does the pt Need Aspirin: No


Condition: Stable


Instructions:  Hiatal Hernia (ED), Diet for Ulcers and Gastritis (ED), 

Gastroesophageal Reflux Disease (ED), Gastroenteritis (ED)


Referrals: 


[facility] [Primary Care Provider] - 3-5 Days


[facility] [Provider Group] - 3-5 Days


Time of Disposition: 15:50

## 2019-04-05 NOTE — XRAY REPORT
CHEST 2 VIEWS



INDICATION: Chest pain.



COMPARISON: 9/18/2017.



FINDINGS: PA and lateral chest radiographs now demonstrate normal 

cardiomediastinal silhouette. Clear lungs. Intact bones.



CONCLUSION: No acute disease in the chest.



Thank you for the opportunity to participate in this patient's care.

## 2019-04-13 ENCOUNTER — HOSPITAL ENCOUNTER (EMERGENCY)
Dept: HOSPITAL 5 - ED | Age: 44
Discharge: HOME | End: 2019-04-13
Payer: COMMERCIAL

## 2019-04-13 VITALS — SYSTOLIC BLOOD PRESSURE: 131 MMHG | DIASTOLIC BLOOD PRESSURE: 95 MMHG

## 2019-04-13 DIAGNOSIS — Z79.84: ICD-10-CM

## 2019-04-13 DIAGNOSIS — E11.9: ICD-10-CM

## 2019-04-13 DIAGNOSIS — Z86.73: ICD-10-CM

## 2019-04-13 DIAGNOSIS — G89.29: ICD-10-CM

## 2019-04-13 DIAGNOSIS — G43.909: ICD-10-CM

## 2019-04-13 DIAGNOSIS — I10: ICD-10-CM

## 2019-04-13 DIAGNOSIS — K44.9: ICD-10-CM

## 2019-04-13 DIAGNOSIS — M54.9: ICD-10-CM

## 2019-04-13 DIAGNOSIS — K21.9: Primary | ICD-10-CM

## 2019-04-13 DIAGNOSIS — Z91.018: ICD-10-CM

## 2019-04-13 LAB
BACTERIA #/AREA URNS HPF: (no result) /HPF
BASOPHILS # (AUTO): 0 K/MM3 (ref 0–0.1)
BASOPHILS NFR BLD AUTO: 0.8 % (ref 0–1.8)
BILIRUB UR QL STRIP: (no result)
BLOOD UR QL VISUAL: (no result)
BUN SERPL-MCNC: 26 MG/DL (ref 9–20)
BUN/CREAT SERPL: 19 %
CALCIUM SERPL-MCNC: 9.7 MG/DL (ref 8.4–10.2)
EOSINOPHIL # BLD AUTO: 0.1 K/MM3 (ref 0–0.4)
EOSINOPHIL NFR BLD AUTO: 1.9 % (ref 0–4.3)
HCT VFR BLD CALC: 39.2 % (ref 35.5–45.6)
HEMOLYSIS INDEX: 16
HGB BLD-MCNC: 13.1 GM/DL (ref 11.8–15.2)
LYMPHOCYTES # BLD AUTO: 1.6 K/MM3 (ref 1.2–5.4)
LYMPHOCYTES NFR BLD AUTO: 28.5 % (ref 13.4–35)
MCHC RBC AUTO-ENTMCNC: 33 % (ref 32–34)
MCV RBC AUTO: 78 FL (ref 84–94)
MONOCYTES # (AUTO): 0.5 K/MM3 (ref 0–0.8)
MONOCYTES % (AUTO): 9.1 % (ref 0–7.3)
PH UR STRIP: 5 [PH] (ref 5–7)
PLATELET # BLD: 353 K/MM3 (ref 140–440)
RBC # BLD AUTO: 5.03 M/MM3 (ref 3.65–5.03)
RBC #/AREA URNS HPF: 1 /HPF (ref 0–6)
UROBILINOGEN UR-MCNC: < 2 MG/DL (ref ?–2)
WBC #/AREA URNS HPF: 1 /HPF (ref 0–6)

## 2019-04-13 PROCEDURE — 85025 COMPLETE CBC W/AUTO DIFF WBC: CPT

## 2019-04-13 PROCEDURE — 80048 BASIC METABOLIC PNL TOTAL CA: CPT

## 2019-04-13 PROCEDURE — 81001 URINALYSIS AUTO W/SCOPE: CPT

## 2019-04-13 PROCEDURE — 36415 COLL VENOUS BLD VENIPUNCTURE: CPT

## 2019-04-13 NOTE — EMERGENCY DEPARTMENT REPORT
Blank Doc





- Documentation


Documentation: 


42 yo male presents to ED cc of right flank and gen abd pain with watery diarhea


states  diarrhea resolved now.


noted small blood in stool





labs,


CT scan was obtained on the 5th at last visit

## 2019-04-13 NOTE — EMERGENCY DEPARTMENT REPORT
ED General Adult HPI





- General


Chief complaint: Abdominal Pain


Stated complaint: ABD CHEST PAIN/BLOOD IN STOOL/BACK PAIN


Time Seen by Provider: 04/13/19 12:15


Source: patient


Mode of arrival: Ambulatory


Limitations: No Limitations





- History of Present Illness


Initial comments: 





Mr. Gray is a 43-year-old male with history of type 2 diabetes, obesity, c

hronic back pain, GERD, hiatal hernia, hypertension presents with diffuse pain 

in the chest back and abdomen for several weeks.  Prescribed Protonix and Ultram

by my colleague on April 5.  Has follow-up at [facility].  Has a 

has bright red blood per rectum.  Has had normal appetite.  Denies fever.  

Denies vomiting.  Mild diffuse pain for several weeks.


-: Gradual, week(s) (3)


Location: chest, abdomen


Severity scale (0 -10): 10


Consistency: constant


Improves with: none


Worsens with: none





- Related Data


                                Home Medications











 Medication  Instructions  Recorded  Confirmed  Last Taken


 


Lisinopril/Hydrochlorothiazide 20 mg PO QDAY 05/19/18 05/19/18 Unknown





[Zestoretic 20-25 mg]    


 


cloNIDine [Catapres] 0.2 mg PO BID 05/19/18 05/19/18 Unknown








                                  Previous Rx's











 Medication  Instructions  Recorded  Last Taken  Type


 


Carvedilol [Coreg] 12.5 mg PO BID #60 tablet 05/24/16 Unknown Rx


 


amLODIPine [Norvasc] 10 mg PO DAILY #30 tablet 05/24/16 Unknown Rx


 


metFORMIN [Glucophage] 500 mg PO BID #60 tablet 02/09/17 Unknown Rx


 


Lisinopril/Hydrochlorothiazide 1 tab PO QDAY #30 tab 05/19/18 Unknown Rx





[Zestoretic 20-25 mg]    


 


Ondansetron [Zofran Odt] 4 mg PO Q8HR PRN #14 tab.rapdis 05/19/18 Unknown Rx


 


Furosemide [Lasix TAB] 40 mg PO QDAY #10 tablet 06/19/18 Unknown Rx


 


traMADol [Ultram 50 MG tab] 50 mg PO Q4HR PRN #10 tablet 06/19/18 Unknown Rx


 


Amoxicillin/Potassium Clav 1 each PO BID #14 tablet 12/23/18 Unknown Rx





[Augmentin 875-125 Tablet]    


 


Fluticasone [Flonase] 1 spray NS QDAY #1 bottle 12/23/18 Unknown Rx


 


Ibuprofen [Motrin] 600 mg PO Q8H PRN #20 tablet 12/23/18 Unknown Rx


 


traMADol [Ultram] 50 mg PO Q6HR PRN #10 tablet 12/23/18 Unknown Rx


 


Dicyclomine [Bentyl] 20 mg PO QID #10 tablet 04/05/19 Unknown Rx


 


Diphenoxylate/Atropine [Lomotil] 1 tab PO Q4H PRN #10 tablet 04/05/19 Unknown Rx


 


Famotidine [Pepcid] 40 mg PO QHS #10 tablet 04/05/19 Unknown Rx


 


Ondansetron [Zofran Odt] 4 mg PO Q8HR #10 tab.rapdis 04/05/19 Unknown Rx


 


Pantoprazole [Protonix] 40 mg PO QDAY #30 tablet 04/05/19 Unknown Rx


 


traMADol [Ultram] 50 mg PO Q6HR PRN #12 tablet 04/05/19 Unknown Rx











                                    Allergies











Allergy/AdvReac Type Severity Reaction Status Date / Time


 


tomato [Tomato] Allergy  Swelling Verified 04/13/19 12:03














ED Review of Systems


ROS: 


Stated complaint: ABD CHEST PAIN/BLOOD IN STOOL/BACK PAIN


Other details as noted in HPI





Comment: All other systems reviewed and negative


Constitutional: denies: fever, malaise


Cardiovascular: chest pain


Gastrointestinal: abdominal pain





ED Past Medical Hx





- Past Medical History


Previous Medical History?: Yes


Hx Hypertension: Yes


Hx CVA: Yes


Hx Congestive Heart Failure: No


Hx Diabetes: Yes


Hx GERD: Yes


Hx Headaches / Migraines: Yes


Hx Asthma: No


Hx COPD: No


Additional medical history: sleep apnea.  chronic back pain





- Surgical History


Additional Surgical History: Right upper extremity surgery secondary to trauma, 

cardiac catheterization





- Social History


Smoking Status: Never Smoker


Substance Use Type: None





- Medications


Home Medications: 


                                Home Medications











 Medication  Instructions  Recorded  Confirmed  Last Taken  Type


 


Carvedilol [Coreg] 12.5 mg PO BID #60 tablet 05/24/16 05/19/18 Unknown Rx


 


amLODIPine [Norvasc] 10 mg PO DAILY #30 tablet 05/24/16 05/19/18 Unknown Rx


 


metFORMIN [Glucophage] 500 mg PO BID #60 tablet 02/09/17 05/19/18 Unknown Rx


 


Lisinopril/Hydrochlorothiazide 1 tab PO QDAY #30 tab 05/19/18  Unknown Rx





[Zestoretic 20-25 mg]     


 


Lisinopril/Hydrochlorothiazide 20 mg PO QDAY 05/19/18 05/19/18 Unknown History





[Zestoretic 20-25 mg]     


 


Ondansetron [Zofran Odt] 4 mg PO Q8HR PRN #14 tab.rapdis 05/19/18  Unknown Rx


 


cloNIDine [Catapres] 0.2 mg PO BID 05/19/18 05/19/18 Unknown History


 


Furosemide [Lasix TAB] 40 mg PO QDAY #10 tablet 06/19/18  Unknown Rx


 


traMADol [Ultram 50 MG tab] 50 mg PO Q4HR PRN #10 tablet 06/19/18  Unknown Rx


 


Amoxicillin/Potassium Clav 1 each PO BID #14 tablet 12/23/18  Unknown Rx





[Augmentin 875-125 Tablet]     


 


Fluticasone [Flonase] 1 spray NS QDAY #1 bottle 12/23/18  Unknown Rx


 


Ibuprofen [Motrin] 600 mg PO Q8H PRN #20 tablet 12/23/18  Unknown Rx


 


traMADol [Ultram] 50 mg PO Q6HR PRN #10 tablet 12/23/18  Unknown Rx


 


Dicyclomine [Bentyl] 20 mg PO QID #10 tablet 04/05/19  Unknown Rx


 


Diphenoxylate/Atropine [Lomotil] 1 tab PO Q4H PRN #10 tablet 04/05/19  Unknown 

Rx


 


Famotidine [Pepcid] 40 mg PO QHS #10 tablet 04/05/19  Unknown Rx


 


Ondansetron [Zofran Odt] 4 mg PO Q8HR #10 tab.rapdis 04/05/19  Unknown Rx


 


Pantoprazole [Protonix] 40 mg PO QDAY #30 tablet 04/05/19  Unknown Rx


 


traMADol [Ultram] 50 mg PO Q6HR PRN #12 tablet 04/05/19  Unknown Rx














ED Physical Exam





- General


Limitations: No Limitations


General appearance: alert, in no apparent distress





- Head


Head exam: Present: atraumatic, normocephalic





- Eye


Eye exam: Present: normal appearance





- ENT


ENT exam: Present: mucous membranes moist





- Neck


Neck exam: Present: normal inspection, full ROM





- Respiratory


Respiratory exam: Present: normal lung sounds bilaterally.  Absent: respiratory 

distress, wheezes, rales, rhonchi





- Cardiovascular


Cardiovascular Exam: Present: regular rate, normal rhythm, normal heart sounds. 

 Absent: systolic murmur, diastolic murmur, rubs, gallop





- GI/Abdominal


GI/Abdominal exam: Present: soft, normal bowel sounds.  Absent: distended, 

tenderness, guarding, rebound





- Rectal


Rectal exam: Present: deferred





- Extremities Exam


Extremities exam: Present: normal inspection





- Back Exam


Back exam: Present: normal inspection





- Neurological Exam


Neurological exam: Present: alert, oriented X3





- Psychiatric


Psychiatric exam: Present: normal affect, normal mood





- Skin


Skin exam: Present: warm, dry, intact, normal color.  Absent: rash





ED Course





                                   Vital Signs











  04/13/19





  12:07


 


Temperature 98.1 F


 


Pulse Rate 118 H


 


Respiratory 18





Rate 


 


Blood Pressure 131/95


 


O2 Sat by Pulse 94





Oximetry 














ED Medical Decision Making





- Lab Data


Result diagrams: 


                                 04/13/19 12:31





                                 04/13/19 12:31








                         Laboratory Results - last 24 hr











  04/13/19 04/13/19





  12:31 12:31


 


WBC  5.6 


 


RBC  5.03 


 


Hgb  13.1 


 


Hct  39.2 


 


MCV  78 L 


 


MCH  26 L 


 


MCHC  33 


 


RDW  15.2 


 


Plt Count  353 


 


Lymph % (Auto)  28.5 


 


Mono % (Auto)  9.1 H 


 


Eos % (Auto)  1.9 


 


Baso % (Auto)  0.8 


 


Lymph #  1.6 


 


Mono #  0.5 


 


Eos #  0.1 


 


Baso #  0.0 


 


Seg Neutrophils %  59.7 


 


Seg Neutrophils #  3.4 


 


Sodium   137


 


Potassium   4.7


 


Chloride   99.1


 


Carbon Dioxide   20 L


 


Anion Gap   23


 


BUN   26 H


 


Creatinine   1.4


 


Estimated GFR   > 60


 


BUN/Creatinine Ratio   19


 


Glucose   184 H


 


Calcium   9.7














- Medical Decision Making





Mr. Gray presents with diffuse pain in the chest abdomen back rectal bleeding

 for several weeks.  CBC chemistry within normal limits.  No evidence of acute 

emergent illness.  I do not suspect peritonitis nor acute coronary syndrome.  No

 persistent chest pain to suggest PE.





Given reassurance.  Strongly recommended close follow-up at outside clinic to 

which he was referred.  Has appointment in 4 days.


Critical care attestation.: 


If time is entered above; I have spent that time in minutes in the direct care 

of this critically ill patient, excluding procedure time.








ED Disposition


Clinical Impression: 


 Chest pain, Chronic back pain, GERD (gastroesophageal reflux disease), Hiatal 

hernia





Disposition: DC-01 TO HOME OR SELFCARE


Is pt being admited?: No


Does the pt Need Aspirin: No


Condition: Stable


Instructions:  Chest Pain (ED)


Referrals: 


[facility] [Outside] - 3-5 Days

## 2019-06-24 ENCOUNTER — HOSPITAL ENCOUNTER (EMERGENCY)
Dept: HOSPITAL 5 - ED | Age: 44
Discharge: HOME | End: 2019-06-24
Payer: SELF-PAY

## 2019-06-24 VITALS — SYSTOLIC BLOOD PRESSURE: 129 MMHG | DIASTOLIC BLOOD PRESSURE: 73 MMHG

## 2019-06-24 DIAGNOSIS — Z79.84: ICD-10-CM

## 2019-06-24 DIAGNOSIS — Z86.73: ICD-10-CM

## 2019-06-24 DIAGNOSIS — I10: ICD-10-CM

## 2019-06-24 DIAGNOSIS — K62.5: ICD-10-CM

## 2019-06-24 DIAGNOSIS — K21.9: ICD-10-CM

## 2019-06-24 DIAGNOSIS — Z91.018: ICD-10-CM

## 2019-06-24 DIAGNOSIS — G89.29: ICD-10-CM

## 2019-06-24 DIAGNOSIS — E11.9: ICD-10-CM

## 2019-06-24 DIAGNOSIS — R10.84: ICD-10-CM

## 2019-06-24 DIAGNOSIS — L03.012: Primary | ICD-10-CM

## 2019-06-24 DIAGNOSIS — G43.909: ICD-10-CM

## 2019-06-24 DIAGNOSIS — R19.7: ICD-10-CM

## 2019-06-24 LAB
ALBUMIN SERPL-MCNC: 4.2 G/DL (ref 3.9–5)
ALT SERPL-CCNC: 17 UNITS/L (ref 7–56)
BASOPHILS # (AUTO): 0 K/MM3 (ref 0–0.1)
BASOPHILS NFR BLD AUTO: 0.4 % (ref 0–1.8)
BUN SERPL-MCNC: 24 MG/DL (ref 9–20)
BUN/CREAT SERPL: 15 %
CALCIUM SERPL-MCNC: 9.8 MG/DL (ref 8.4–10.2)
EOSINOPHIL # BLD AUTO: 0.1 K/MM3 (ref 0–0.4)
EOSINOPHIL NFR BLD AUTO: 1.7 % (ref 0–4.3)
HCT VFR BLD CALC: 35.1 % (ref 35.5–45.6)
HEMOLYSIS INDEX: 31
HGB BLD-MCNC: 11.6 GM/DL (ref 11.8–15.2)
LYMPHOCYTES # BLD AUTO: 1.6 K/MM3 (ref 1.2–5.4)
LYMPHOCYTES NFR BLD AUTO: 24.3 % (ref 13.4–35)
MCHC RBC AUTO-ENTMCNC: 33 % (ref 32–34)
MCV RBC AUTO: 77 FL (ref 84–94)
MONOCYTES # (AUTO): 0.6 K/MM3 (ref 0–0.8)
MONOCYTES % (AUTO): 8.7 % (ref 0–7.3)
PLATELET # BLD: 282 K/MM3 (ref 140–440)
RBC # BLD AUTO: 4.56 M/MM3 (ref 3.65–5.03)

## 2019-06-24 PROCEDURE — 85025 COMPLETE CBC W/AUTO DIFF WBC: CPT

## 2019-06-24 PROCEDURE — 99283 EMERGENCY DEPT VISIT LOW MDM: CPT

## 2019-06-24 PROCEDURE — 96372 THER/PROPH/DIAG INJ SC/IM: CPT

## 2019-06-24 PROCEDURE — 36415 COLL VENOUS BLD VENIPUNCTURE: CPT

## 2019-06-24 PROCEDURE — 10060 I&D ABSCESS SIMPLE/SINGLE: CPT

## 2019-06-24 PROCEDURE — 82271 OCCULT BLOOD OTHER SOURCES: CPT

## 2019-06-24 PROCEDURE — 80053 COMPREHEN METABOLIC PANEL: CPT

## 2019-06-24 PROCEDURE — 74019 RADEX ABDOMEN 2 VIEWS: CPT

## 2019-06-24 NOTE — EMERGENCY DEPARTMENT REPORT
ED General Adult HPI





- General


Chief complaint: Abdominal Pain


Stated complaint: POSS HERNIA/FEVER PAIN


Time Seen by Provider: 06/24/19 17:11


Source: patient


Mode of arrival: Ambulatory


Limitations: No Limitations





- History of Present Illness


Initial comments: 





Patient is a 43-year-old male who presents to the emergency Department with 

complaints of periumbilical abdominal discomfort for the last month.  He has 

associated nausea and a couple episodes of diarrhea.  He states he has a small 

amount of blood when he wipes from the rectum.  He denies any vomiting.  He 

denies any fever.  He has never had a colonoscopy.  He has been evaluated in the

emergency department for these complaints and been evaluated by [facility] for these complaints..  On his last CT abd pelvis in April he has a very

small fat filled umbilical hernia, otherwise normal CT abd/pelvis.  He states he

has not had his medications for acid reflux for the last week.  He states he 

believes his symptoms are related to his acid reflux and hernia.  He denies any 

history of hemorrhoids.  He has a past medical history of diabetes and 

hypertension.  He states he is on metformin and has had diarrhea from metformin 

in the past.  He denies any allergies to medications.  He also presents for 

right middle finger swelling for the last 3 days.  He states that it is 

surrounding his nail.  He has never had this before.  





- Related Data


                                Home Medications











 Medication  Instructions  Recorded  Confirmed  Last Taken


 


Lisinopril/Hydrochlorothiazide 20 mg PO QDAY 05/19/18 05/19/18 Unknown





[Zestoretic 20-25 mg]    


 


cloNIDine [Catapres] 0.2 mg PO BID 05/19/18 05/19/18 Unknown








                                  Previous Rx's











 Medication  Instructions  Recorded  Last Taken  Type


 


Carvedilol [Coreg] 12.5 mg PO BID #60 tablet 05/24/16 Unknown Rx


 


amLODIPine [Norvasc] 10 mg PO DAILY #30 tablet 05/24/16 Unknown Rx


 


metFORMIN [Glucophage] 500 mg PO BID #60 tablet 02/09/17 Unknown Rx


 


Lisinopril/Hydrochlorothiazide 1 tab PO QDAY #30 tab 05/19/18 Unknown Rx





[Zestoretic 20-25 mg]    


 


Ondansetron [Zofran Odt] 4 mg PO Q8HR PRN #14 tab.rapdis 05/19/18 Unknown Rx


 


Furosemide [Lasix TAB] 40 mg PO QDAY #10 tablet 06/19/18 Unknown Rx


 


traMADol [Ultram 50 MG tab] 50 mg PO Q4HR PRN #10 tablet 06/19/18 Unknown Rx


 


Amoxicillin/Potassium Clav 1 each PO BID #14 tablet 12/23/18 Unknown Rx





[Augmentin 875-125 Tablet]    


 


Fluticasone [Flonase] 1 spray NS QDAY #1 bottle 12/23/18 Unknown Rx


 


Ibuprofen [Motrin] 600 mg PO Q8H PRN #20 tablet 12/23/18 Unknown Rx


 


traMADol [Ultram] 50 mg PO Q6HR PRN #10 tablet 12/23/18 Unknown Rx


 


Diphenoxylate/Atropine [Lomotil] 1 tab PO Q4H PRN #10 tablet 04/05/19 Unknown Rx


 


Ondansetron [Zofran Odt] 4 mg PO Q8HR #10 tab.rapdis 04/05/19 Unknown Rx


 


Pantoprazole [Protonix] 40 mg PO QDAY #30 tablet 04/05/19 Unknown Rx


 


traMADol [Ultram] 50 mg PO Q6HR PRN #12 tablet 04/05/19 Unknown Rx


 


Dicyclomine [Bentyl] 20 mg PO QID PRN #20 tablet 06/24/19 Unknown Rx


 


Famotidine [Pepcid] 40 mg PO QHS #30 tablet 06/24/19 Unknown Rx


 


Sucralfate [Carafate] 1 gm PO Q6HR #5 tablet 06/24/19 Unknown Rx


 


cephALEXin [Keflex] 500 mg PO QID 7 Days #28 capsule 06/24/19 Unknown Rx











                                    Allergies











Allergy/AdvReac Type Severity Reaction Status Date / Time


 


tomato [Tomato] Allergy  Swelling Verified 04/13/19 12:03














ED Review of Systems


ROS: 


Stated complaint: POSS HERNIA/FEVER PAIN


Other details as noted in HPI





Comment: All other systems reviewed and negative





ED Past Medical Hx





- Past Medical History


Previous Medical History?: Yes


Hx Hypertension: Yes


Hx CVA: Yes


Hx Congestive Heart Failure: No


Hx Diabetes: Yes


Hx GERD: Yes


Hx Headaches / Migraines: Yes


Hx Asthma: No


Hx COPD: No


Additional medical history: sleep apnea, hiatal hernia.  chronic back pain





- Surgical History


Past Surgical History?: Yes


Additional Surgical History: Right upper extremity surgery secondary to trauma, 

cardiac catheterization





- Social History


Smoking Status: Never Smoker


Substance Use Type: None





- Medications


Home Medications: 


                                Home Medications











 Medication  Instructions  Recorded  Confirmed  Last Taken  Type


 


Carvedilol [Coreg] 12.5 mg PO BID #60 tablet 05/24/16 05/19/18 Unknown Rx


 


amLODIPine [Norvasc] 10 mg PO DAILY #30 tablet 05/24/16 05/19/18 Unknown Rx


 


metFORMIN [Glucophage] 500 mg PO BID #60 tablet 02/09/17 05/19/18 Unknown Rx


 


Lisinopril/Hydrochlorothiazide 1 tab PO QDAY #30 tab 05/19/18  Unknown Rx





[Zestoretic 20-25 mg]     


 


Lisinopril/Hydrochlorothiazide 20 mg PO QDAY 05/19/18 05/19/18 Unknown History





[Zestoretic 20-25 mg]     


 


Ondansetron [Zofran Odt] 4 mg PO Q8HR PRN #14 tab.rapdis 05/19/18  Unknown Rx


 


cloNIDine [Catapres] 0.2 mg PO BID 05/19/18 05/19/18 Unknown History


 


Furosemide [Lasix TAB] 40 mg PO QDAY #10 tablet 06/19/18  Unknown Rx


 


traMADol [Ultram 50 MG tab] 50 mg PO Q4HR PRN #10 tablet 06/19/18  Unknown Rx


 


Amoxicillin/Potassium Clav 1 each PO BID #14 tablet 12/23/18  Unknown Rx





[Augmentin 875-125 Tablet]     


 


Fluticasone [Flonase] 1 spray NS QDAY #1 bottle 12/23/18  Unknown Rx


 


Ibuprofen [Motrin] 600 mg PO Q8H PRN #20 tablet 12/23/18  Unknown Rx


 


traMADol [Ultram] 50 mg PO Q6HR PRN #10 tablet 12/23/18  Unknown Rx


 


Diphenoxylate/Atropine [Lomotil] 1 tab PO Q4H PRN #10 tablet 04/05/19  Unknown 

Rx


 


Ondansetron [Zofran Odt] 4 mg PO Q8HR #10 tab.rapdis 04/05/19  Unknown Rx


 


Pantoprazole [Protonix] 40 mg PO QDAY #30 tablet 04/05/19  Unknown Rx


 


traMADol [Ultram] 50 mg PO Q6HR PRN #12 tablet 04/05/19  Unknown Rx


 


Dicyclomine [Bentyl] 20 mg PO QID PRN #20 tablet 06/24/19  Unknown Rx


 


Famotidine [Pepcid] 40 mg PO QHS #30 tablet 06/24/19  Unknown Rx


 


Sucralfate [Carafate] 1 gm PO Q6HR #5 tablet 06/24/19  Unknown Rx


 


cephALEXin [Keflex] 500 mg PO QID 7 Days #28 capsule 06/24/19  Unknown Rx














ED Physical Exam





- General


Limitations: No Limitations


General appearance: alert, in no apparent distress





- Head


Head exam: Present: atraumatic, normocephalic





- Eye


Eye exam: Present: normal appearance, PERRL





- ENT


ENT exam: Present: mucous membranes moist





- Respiratory


Respiratory exam: Present: normal lung sounds bilaterally.  Absent: respiratory 

distress, wheezes, rales, rhonchi, stridor, chest wall tenderness, accessory 

muscle use, decreased breath sounds, prolonged expiratory





- Cardiovascular


Cardiovascular Exam: Present: regular rate, normal rhythm, normal heart sounds. 

 Absent: systolic murmur, diastolic murmur, rubs, gallop





- GI/Abdominal


GI/Abdominal exam: Present: soft, normal bowel sounds.  Absent: distended, 

tenderness, guarding, rebound, rigid, hernia





- Rectal


Rectal exam: Present: normal inspection, normal rectal tone, heme (-) stool, 

other (chaperone: Reid, RN, brown stool on exam, no external or internal 

hemorrhoids, no anal fissure, no gross blood).  Absent: hemorrhoids, mass, 

tenderness





- Neurological Exam


Neurological exam: Present: alert, oriented X3





- Psychiatric


Psychiatric exam: Present: normal affect, normal mood





- Skin


Skin exam: Present: warm, dry, other (small area of fluctuance to the left side 

of the left middle finger, no surrounding erythema, no nail invovlement)





ED Course


                                   Vital Signs











  06/24/19 06/24/19





  15:03 21:55


 


Temperature 98.3 F 


 


Pulse Rate 109 H 74


 


Respiratory 18 17





Rate  


 


Blood Pressure 129/73 


 


O2 Sat by Pulse 100 100





Oximetry  














- I & D


  ** Left Finger


Type of Procedure: Simple


Site: small area of fluctuance to the left side of the left middle finger 


Blade Size: 11


I & D Procedure: betadine prep, sterile drapes applied, sterile dressing applied


Progress: 





right middle finger cleaned with betadine, 3 cc of 2% lidocaine used for digital

 block, 0.5 cm incision made with 11 blade and small amount of purulent drainage

 expressed, irrigated with 20 cc of saline, bleeding well controlled, pt 

tolerated well, sterile dressing applied





ED Medical Decision Making





- Lab Data


Result diagrams: 


                                 06/24/19 15:07





                                 06/24/19 15:07








                                   Lab Results











  06/24/19 06/24/19 Range/Units





  15:07 15:07 


 


WBC  6.5   (4.5-11.0)  K/mm3


 


RBC  4.56   (3.65-5.03)  M/mm3


 


Hgb  11.6 L   (11.8-15.2)  gm/dl


 


Hct  35.1 L   (35.5-45.6)  %


 


MCV  77 L   (84-94)  fl


 


MCH  26 L   (28-32)  pg


 


MCHC  33   (32-34)  %


 


RDW  16.0 H   (13.2-15.2)  %


 


Plt Count  282   (140-440)  K/mm3


 


Lymph % (Auto)  24.3   (13.4-35.0)  %


 


Mono % (Auto)  8.7 H   (0.0-7.3)  %


 


Eos % (Auto)  1.7   (0.0-4.3)  %


 


Baso % (Auto)  0.4   (0.0-1.8)  %


 


Lymph #  1.6   (1.2-5.4)  K/mm3


 


Mono #  0.6   (0.0-0.8)  K/mm3


 


Eos #  0.1   (0.0-0.4)  K/mm3


 


Baso #  0.0   (0.0-0.1)  K/mm3


 


Seg Neutrophils %  64.9   (40.0-70.0)  %


 


Seg Neutrophils #  4.2   (1.8-7.7)  K/mm3


 


Sodium   134 L  (137-145)  mmol/L


 


Potassium   4.6  (3.6-5.0)  mmol/L


 


Chloride   95.4 L  ()  mmol/L


 


Carbon Dioxide   21 L  (22-30)  mmol/L


 


Anion Gap   22  mmol/L


 


BUN   24 H  (9-20)  mg/dL


 


Creatinine   1.6 H  (0.8-1.5)  mg/dL


 


Estimated GFR   57  ml/min


 


BUN/Creatinine Ratio   15  %


 


Glucose   277 H  ()  mg/dL


 


Calcium   9.8  (8.4-10.2)  mg/dL


 


Total Bilirubin   0.40  (0.1-1.2)  mg/dL


 


AST   16  (5-40)  units/L


 


ALT   17  (7-56)  units/L


 


Alkaline Phosphatase   79  ()  units/L


 


Total Protein   8.1  (6.3-8.2)  g/dL


 


Albumin   4.2  (3.9-5)  g/dL


 


Albumin/Globulin Ratio   1.1  %














- Radiology Data


Radiology results: report reviewed





PROCEDURE: XR ABDOMEN 2V 





TECHNIQUE: Abdomen supine and upright 





HISTORY: abd discomfort 





COMPARISONS: 





FINDINGS: 





No abnormal calcifications are identified. 





No evidence for colonic or small bowel distention. There are no signs for free 

air. No acute 


skeletal findings 





IMPRESSION: 





Nonobstructive bowel gas pattern. No acute abnormality identified. 





This document is electronically signed by Luis Xiong MD., June 24 2019 

06:20:19 PM ET 





Transcribed By: BLAINE 


Dictated By: ADRIANA XIONG MD 


Electronically Authenticated By: ADRIANA XIONG MD 


Signed Date/Time: 06/24/19 1822 





- Medical Decision Making





Patient is a 43-year-old male who presents to the emergency Department with 

complaints of periumbilical abdominal discomfort for the last month.  He has 

associated nausea and a couple episodes of diarrhea.  He states he has a small 

amount of blood when he wipes from the rectum.  He denies any vomiting.  He 

denies any fever.  He has never had a colonoscopy.  He has been evaluated in the

 emergency department for these complaints and been evaluated by [facility] for these complaints..  On his last CT abd pelvis in April he has a very

 small fat filled umbilical hernia, otherwise normal CT abd/pelvis.  He states 

he has not had his medications for acid reflux for the last week.  He states he 

believes his symptoms are related to his acid reflux and hernia.  He denies any 

history of hemorrhoids.  He has a past medical history of diabetes and hyperte

nsion.  He states he is on metformin and has had diarrhea from metformin in the 

past.  He denies any allergies to medications.  He also presents for right 

middle finger swelling for the last 3 days.  He states that it is surrounding 

his nail.  He has never had this before. vitals are stable. no abd tenderness on

 exam. on skin exam small paronychia present to the left middle finger, I&D 

peformed per procedure note, advised to keep clean and dry, may wash with soap 

and water and immediately dry, placed on keflex to cover for skin infection due 

to DM. labs with no leukocytosis, no anemia, elevated BG at 277, hx of DM, mild 

JOSÉ MIGUEL with mild dehydration. pt given a 1L of normal saline and medications while 

in the ED, symptoms improved. XR of the abdomen with no acute process. hemoocult

 of the stool is negative, normal rectal exam, H/H are normal, will have pt 

follow up with GI. pt given pepcid, carafate, and bentyl. advised to please take

 all medication as prescribed.  Please drink plenty of water.  Follow up with a 

primary care doctor in next 2-3 days.  Follow-up with a GI doctor in the next 2-

3 days.  Return to the emergency room for any new or worsening symptoms.  Please

 keep area clean and dry.  Wash with soap and water and immediately dry. 





- Differential Diagnosis


GERD, SBO, gastritis, PUD, hernia 


Critical care attestation.: 


If time is entered above; I have spent that time in minutes in the direct care 

of this critically ill patient, excluding procedure time.








ED Disposition


Clinical Impression: 


 Paronychia, Rectal bleeding, Encounter for incision and drainage procedure





Abdominal pain


Qualifiers:


 Abdominal location: generalized Qualified Code(s): R10.84 - Generalized 

abdominal pain





Diarrhea


Qualifiers:


 Diarrhea type: unspecified type Qualified Code(s): R19.7 - Diarrhea, 

unspecified





Disposition: DC-01 TO HOME OR SELFCARE


Is pt being admited?: No


Does the pt Need Aspirin: No


Condition: Stable


Instructions:  Paronychia (ED), Abdominal Pain (ED), Incision and Drainage (ED)


Additional Instructions: 


Please take all medication as prescribed.  Please drink plenty of water.  Follow

 up with a primary care doctor in next 2-3 days.  Follow-up with a GI doctor in 

the next 2-3 days.  Return to the emergency room for any new or worsening 

symptoms.  Please keep area clean and dry.  Wash with soap and water and 

immediately dry. 


Prescriptions: 


Famotidine [Pepcid] 40 mg PO QHS #30 tablet


Dicyclomine [Bentyl] 20 mg PO QID PRN #20 tablet


 PRN Reason: pain


Sucralfate [Carafate] 1 gm PO Q6HR #5 tablet


cephALEXin [Keflex] 500 mg PO QID 7 Days #28 capsule


Referrals: 


[facility] [Primary Care Provider] - 2-3 Days


[facility] [Provider Group] - 2-3 Days


[facility] [Provider Group] - 2-3 Days


Forms:  Accompanied Note, Work/School Release Form(ED)


Time of Disposition: 21:34


Print Language: ENGLISH

## 2019-06-24 NOTE — EVENT NOTE
ED Screening Note


Date of service: 06/24/19


Time: 15:04


ED Screening Note: 


44 y/o male here for multiple complaints of left middle finger, hernia pain, 

blood in stool. 





This initial assessment/diagnostic orders/clinical plan/treatment(s) is/are 

subject to change based on patients health status, clinical progression and re-

assessment by fellow clinical providers in the ED. Further treatment and workup 

at subsequent clinical providers discretion. Patient/guardian urged not to elope

from the ED as their condition may be serious if not clinically assessed and 

managed. 





Initial orders include:

## 2019-06-24 NOTE — XRAY REPORT
PROCEDURE: XR ABDOMEN 2V 

 

TECHNIQUE:  Abdomen supine and upright 

 

HISTORY: abd discomfort 

 

COMPARISONS: 

 

FINDINGS: 

 

No abnormal calcifications are identified. 

 

No evidence for colonic or small bowel distention. There are no signs for free air. No acute skeletal
 findings 

 

IMPRESSION:  

 

Nonobstructive bowel gas pattern. No acute abnormality identified. 

 

This document is electronically signed by Luis Freedman MD., June 24 2019 06:20:19 PM ET

## 2019-07-22 ENCOUNTER — HOSPITAL ENCOUNTER (EMERGENCY)
Dept: HOSPITAL 5 - ED | Age: 44
Discharge: HOME | End: 2019-07-22
Payer: SELF-PAY

## 2019-07-22 VITALS — DIASTOLIC BLOOD PRESSURE: 84 MMHG | SYSTOLIC BLOOD PRESSURE: 121 MMHG

## 2019-07-22 DIAGNOSIS — R20.2: ICD-10-CM

## 2019-07-22 DIAGNOSIS — E11.9: ICD-10-CM

## 2019-07-22 DIAGNOSIS — Z86.73: ICD-10-CM

## 2019-07-22 DIAGNOSIS — Z79.2: ICD-10-CM

## 2019-07-22 DIAGNOSIS — Z79.1: ICD-10-CM

## 2019-07-22 DIAGNOSIS — Z79.899: ICD-10-CM

## 2019-07-22 DIAGNOSIS — G89.29: ICD-10-CM

## 2019-07-22 DIAGNOSIS — Z98.890: ICD-10-CM

## 2019-07-22 DIAGNOSIS — Z91.018: ICD-10-CM

## 2019-07-22 DIAGNOSIS — M79.645: Primary | ICD-10-CM

## 2019-07-22 DIAGNOSIS — M54.9: ICD-10-CM

## 2019-07-22 DIAGNOSIS — Z95.818: ICD-10-CM

## 2019-07-22 DIAGNOSIS — K21.9: ICD-10-CM

## 2019-07-22 DIAGNOSIS — G43.909: ICD-10-CM

## 2019-07-22 DIAGNOSIS — I10: ICD-10-CM

## 2019-07-22 NOTE — XRAY REPORT
LEFT HAND 3 VIEWS



INDICATION / CLINICAL INFORMATION:

Left hand pain for 2 weeks.



COMPARISON:

None available.

 

FINDINGS:



BONES / JOINT(S): The joint spaces are well-maintained. No significant arthritis. There is no evidenc
e of fracture, dislocation or destructive lesion.

SOFT TISSUES: No significant abnormality.



ADDITIONAL FINDINGS: None.



IMPRESSION: Negative study.



Signer Name: Johnny Gonzales MD 

Signed: 7/22/2019 2:26 PM

 Workstation Name: [id number]

## 2019-07-22 NOTE — EMERGENCY DEPARTMENT REPORT
ED General Adult HPI





- General


Chief complaint: Extremity Injury, Upper


Stated complaint: LFT FINGER TINGLE/SHARP PAIN


Time Seen by Provider: 07/22/19 11:40


Source: patient


Mode of arrival: Ambulatory


Limitations: No Limitations





- History of Present Illness


Initial comments: 





She presents to the emergency department with a chief complaint of left middle 

digit pain 2-3 weeks.  Patient states couple weeks ago he had an infection at 

the tip of his finger that was drained.  Since that time the patient complains 

of sharp pains running up his finger into his arm.


-: Gradual


Location: upper extremity


Severity scale (0 -10): 2


Quality: aching


Consistency: constant


Improves with: none


Worsens with: none


Associated Symptoms: denies other symptoms


Treatments Prior to Arrival: none





- Related Data


                                Home Medications











 Medication  Instructions  Recorded  Confirmed  Last Taken


 


Lisinopril/Hydrochlorothiazide 20 mg PO QDAY 05/19/18 05/19/18 Unknown





[Zestoretic 20-25 mg]    


 


cloNIDine [Catapres] 0.2 mg PO BID 05/19/18 05/19/18 Unknown








                                  Previous Rx's











 Medication  Instructions  Recorded  Last Taken  Type


 


Carvedilol [Coreg] 12.5 mg PO BID #60 tablet 05/24/16 Unknown Rx


 


amLODIPine [Norvasc] 10 mg PO DAILY #30 tablet 05/24/16 Unknown Rx


 


metFORMIN [Glucophage] 500 mg PO BID #60 tablet 02/09/17 Unknown Rx


 


Lisinopril/Hydrochlorothiazide 1 tab PO QDAY #30 tab 05/19/18 Unknown Rx





[Zestoretic 20-25 mg]    


 


Ondansetron [Zofran Odt] 4 mg PO Q8HR PRN #14 tab.rapdis 05/19/18 Unknown Rx


 


Furosemide [Lasix TAB] 40 mg PO QDAY #10 tablet 06/19/18 Unknown Rx


 


traMADol [Ultram 50 MG tab] 50 mg PO Q4HR PRN #10 tablet 06/19/18 Unknown Rx


 


Amoxicillin/Potassium Clav 1 each PO BID #14 tablet 12/23/18 Unknown Rx





[Augmentin 875-125 Tablet]    


 


Fluticasone [Flonase] 1 spray NS QDAY #1 bottle 12/23/18 Unknown Rx


 


Ibuprofen [Motrin] 600 mg PO Q8H PRN #20 tablet 12/23/18 Unknown Rx


 


traMADol [Ultram] 50 mg PO Q6HR PRN #10 tablet 12/23/18 Unknown Rx


 


Diphenoxylate/Atropine [Lomotil] 1 tab PO Q4H PRN #10 tablet 04/05/19 Unknown Rx


 


Ondansetron [Zofran Odt] 4 mg PO Q8HR #10 tab.rapdis 04/05/19 Unknown Rx


 


Pantoprazole [Protonix] 40 mg PO QDAY #30 tablet 04/05/19 Unknown Rx


 


traMADol [Ultram] 50 mg PO Q6HR PRN #12 tablet 04/05/19 Unknown Rx


 


Dicyclomine [Bentyl] 20 mg PO QID PRN #20 tablet 06/24/19 Unknown Rx


 


Famotidine [Pepcid] 40 mg PO QHS #30 tablet 06/24/19 Unknown Rx


 


Sucralfate [Carafate] 1 gm PO Q6HR #5 tablet 06/24/19 Unknown Rx


 


cephALEXin [Keflex] 500 mg PO QID 7 Days #28 capsule 06/24/19 Unknown Rx


 


Ibuprofen [Motrin] 600 mg PO Q8H PRN #30 tablet 07/22/19 Unknown Rx


 


Prednisone [predniSONE 5 mg (6-Day 5 mg PO .TAPER #1 tab.ds.pk 07/22/19 Unknown 

Rx





Pack, 21 Tabs)]    











                                    Allergies











Allergy/AdvReac Type Severity Reaction Status Date / Time


 


tomato [Tomato] Allergy  Swelling Verified 04/13/19 12:03














ED Review of Systems


ROS: 


Stated complaint: LFT FINGER TINGLE/SHARP PAIN


Other details as noted in HPI





Comment: All other systems reviewed and negative


Constitutional: denies: chills, fever


Eyes: denies: eye pain, eye discharge, vision change


ENT: denies: ear pain, throat pain


Respiratory: denies: cough, shortness of breath, wheezing


Cardiovascular: denies: chest pain, palpitations


Endocrine: no symptoms reported


Gastrointestinal: denies: abdominal pain, nausea, diarrhea


Genitourinary: denies: urgency, dysuria


Musculoskeletal: denies: back pain, joint swelling, arthralgia


Skin: denies: rash, lesions


Neurological: denies: headache, weakness, paresthesias


Psychiatric: denies: anxiety, depression


Hematological/Lymphatic: denies: easy bleeding, easy bruising





ED Past Medical Hx





- Past Medical History


Hx Hypertension: Yes


Hx CVA: Yes


Hx Congestive Heart Failure: No


Hx Diabetes: Yes


Hx GERD: Yes


Hx Headaches / Migraines: Yes


Hx Asthma: No


Hx COPD: No


Additional medical history: sleep apnea, hiatal hernia.  chronic back pain





- Surgical History


Additional Surgical History: Right upper extremity surgery secondary to trauma, 

cardiac catheterization





- Social History


Smoking Status: Never Smoker


Substance Use Type: None





- Medications


Home Medications: 


                                Home Medications











 Medication  Instructions  Recorded  Confirmed  Last Taken  Type


 


Carvedilol [Coreg] 12.5 mg PO BID #60 tablet 05/24/16 05/19/18 Unknown Rx


 


amLODIPine [Norvasc] 10 mg PO DAILY #30 tablet 05/24/16 05/19/18 Unknown Rx


 


metFORMIN [Glucophage] 500 mg PO BID #60 tablet 02/09/17 05/19/18 Unknown Rx


 


Lisinopril/Hydrochlorothiazide 1 tab PO QDAY #30 tab 05/19/18  Unknown Rx





[Zestoretic 20-25 mg]     


 


Lisinopril/Hydrochlorothiazide 20 mg PO QDAY 05/19/18 05/19/18 Unknown History





[Zestoretic 20-25 mg]     


 


Ondansetron [Zofran Odt] 4 mg PO Q8HR PRN #14 tab.rapdis 05/19/18  Unknown Rx


 


cloNIDine [Catapres] 0.2 mg PO BID 05/19/18 05/19/18 Unknown History


 


Furosemide [Lasix TAB] 40 mg PO QDAY #10 tablet 06/19/18  Unknown Rx


 


traMADol [Ultram 50 MG tab] 50 mg PO Q4HR PRN #10 tablet 06/19/18  Unknown Rx


 


Amoxicillin/Potassium Clav 1 each PO BID #14 tablet 12/23/18  Unknown Rx





[Augmentin 875-125 Tablet]     


 


Fluticasone [Flonase] 1 spray NS QDAY #1 bottle 12/23/18  Unknown Rx


 


Ibuprofen [Motrin] 600 mg PO Q8H PRN #20 tablet 12/23/18  Unknown Rx


 


traMADol [Ultram] 50 mg PO Q6HR PRN #10 tablet 12/23/18  Unknown Rx


 


Diphenoxylate/Atropine [Lomotil] 1 tab PO Q4H PRN #10 tablet 04/05/19  Unknown 

Rx


 


Ondansetron [Zofran Odt] 4 mg PO Q8HR #10 tab.rapdis 04/05/19  Unknown Rx


 


Pantoprazole [Protonix] 40 mg PO QDAY #30 tablet 04/05/19  Unknown Rx


 


traMADol [Ultram] 50 mg PO Q6HR PRN #12 tablet 04/05/19  Unknown Rx


 


Dicyclomine [Bentyl] 20 mg PO QID PRN #20 tablet 06/24/19  Unknown Rx


 


Famotidine [Pepcid] 40 mg PO QHS #30 tablet 06/24/19  Unknown Rx


 


Sucralfate [Carafate] 1 gm PO Q6HR #5 tablet 06/24/19  Unknown Rx


 


cephALEXin [Keflex] 500 mg PO QID 7 Days #28 capsule 06/24/19  Unknown Rx


 


Ibuprofen [Motrin] 600 mg PO Q8H PRN #30 tablet 07/22/19  Unknown Rx


 


Prednisone [predniSONE 5 mg (6-Day 5 mg PO .TAPER #1 tab.ds.pk 07/22/19  Unknown

 Rx





Pack, 21 Tabs)]     














ED Physical Exam





- General


Limitations: No Limitations


General appearance: alert, in no apparent distress





- Head


Head exam: Present: atraumatic, normocephalic





- Eye


Eye exam: Present: normal appearance





- ENT


ENT exam: Present: mucous membranes moist





- Neck


Neck exam: Present: normal inspection





- Respiratory


Respiratory exam: Present: normal lung sounds bilaterally.  Absent: respiratory 

distress





- Cardiovascular


Cardiovascular Exam: Present: regular rate, normal rhythm.  Absent: systolic mur

mur, diastolic murmur, rubs, gallop





- GI/Abdominal


GI/Abdominal exam: Present: soft, normal bowel sounds





- Rectal


Rectal exam: Present: deferred





- Extremities Exam


Extremities exam: Present: normal inspection





- Back Exam


Back exam: Present: normal inspection





- Neurological Exam


Neurological exam: Present: alert, oriented X3, CN II-XII intact.  Absent: motor

sensory deficit





- Psychiatric


Psychiatric exam: Present: normal affect, normal mood





- Skin


Skin exam: Present: warm, dry, intact, normal color.  Absent: rash





ED Course





                                   Vital Signs











  07/22/19





  11:31


 


Temperature 98.1 F


 


Pulse Rate 88


 


Respiratory 16





Rate 


 


Blood Pressure 121/84


 


O2 Sat by Pulse 98





Oximetry 














ED Medical Decision Making





- Medical Decision Making





discussed results with patient


Critical care attestation.: 


If time is entered above; I have spent that time in minutes in the direct care 

of this critically ill patient, excluding procedure time.








ED Disposition


Clinical Impression: 


 Finger pain, left, Paresthesias in left hand





Disposition: DC-01 TO HOME OR SELFCARE


Is pt being admited?: No


Does the pt Need Aspirin: No


Condition: Stable


Instructions:  Paresthesia (ED)


Additional Instructions: 


return if worse


Prescriptions: 


Ibuprofen [Motrin] 600 mg PO Q8H PRN #30 tablet


 PRN Reason: Pain


Prednisone [predniSONE 5 mg (6-Day Pack, 21 Tabs)] 5 mg PO .TAPER #1 tab.ds.pk


Referrals: 


[facility] [Primary Care Provider] - 3-5 Days


[facility] [Provider Group] - 3-5 Days


[facility] [Provider Group] - 3-5 Days


Time of Disposition: 14:07

## 2019-07-22 NOTE — EMERGENCY DEPARTMENT REPORT
Blank Doc





- Documentation


Documentation: 





This is a 43-year-old male that presents with left middle finger pain and ting

ling sensation s/p fall.





This initial assessment/diagnostic orders/clinical plan/treatment(s) is/are 

subject to change based on patient's health status, clinical progression and re-

assessment by fellow clinical providers in the ED.  Further treatment and workup

at subsequent clinical providers discretion.  Patient/guardians urged not to 

elope from the ED as their condition may be serious if not clinically assessed 

and managed.  Initial orders include:


1- Patient sent to [facility] for further evaluation and treatment


2- xray

## 2019-08-15 ENCOUNTER — HOSPITAL ENCOUNTER (EMERGENCY)
Dept: HOSPITAL 5 - ED | Age: 44
Discharge: HOME | End: 2019-08-15
Payer: SELF-PAY

## 2019-08-15 VITALS — DIASTOLIC BLOOD PRESSURE: 94 MMHG | SYSTOLIC BLOOD PRESSURE: 142 MMHG

## 2019-08-15 DIAGNOSIS — Z87.891: ICD-10-CM

## 2019-08-15 DIAGNOSIS — G89.29: ICD-10-CM

## 2019-08-15 DIAGNOSIS — I63.9: ICD-10-CM

## 2019-08-15 DIAGNOSIS — K21.9: ICD-10-CM

## 2019-08-15 DIAGNOSIS — I10: ICD-10-CM

## 2019-08-15 DIAGNOSIS — Z91.018: ICD-10-CM

## 2019-08-15 DIAGNOSIS — E11.9: ICD-10-CM

## 2019-08-15 DIAGNOSIS — R07.89: Primary | ICD-10-CM

## 2019-08-15 DIAGNOSIS — G43.909: ICD-10-CM

## 2019-08-15 DIAGNOSIS — Z79.899: ICD-10-CM

## 2019-08-15 LAB
BASOPHILS # (AUTO): 0 K/MM3 (ref 0–0.1)
BASOPHILS NFR BLD AUTO: 0.7 % (ref 0–1.8)
BUN SERPL-MCNC: 22 MG/DL (ref 9–20)
BUN/CREAT SERPL: 14 %
CALCIUM SERPL-MCNC: 10 MG/DL (ref 8.4–10.2)
EOSINOPHIL # BLD AUTO: 0.1 K/MM3 (ref 0–0.4)
EOSINOPHIL NFR BLD AUTO: 2.4 % (ref 0–4.3)
HCT VFR BLD CALC: 36.8 % (ref 35.5–45.6)
HEMOLYSIS INDEX: 3
HGB BLD-MCNC: 11.8 GM/DL (ref 11.8–15.2)
LYMPHOCYTES # BLD AUTO: 1.8 K/MM3 (ref 1.2–5.4)
LYMPHOCYTES NFR BLD AUTO: 32.7 % (ref 13.4–35)
MCHC RBC AUTO-ENTMCNC: 32 % (ref 32–34)
MCV RBC AUTO: 78 FL (ref 84–94)
MONOCYTES # (AUTO): 0.4 K/MM3 (ref 0–0.8)
MONOCYTES % (AUTO): 7 % (ref 0–7.3)
PLATELET # BLD: 269 K/MM3 (ref 140–440)
RBC # BLD AUTO: 4.75 M/MM3 (ref 3.65–5.03)

## 2019-08-15 PROCEDURE — 36415 COLL VENOUS BLD VENIPUNCTURE: CPT

## 2019-08-15 PROCEDURE — 93010 ELECTROCARDIOGRAM REPORT: CPT

## 2019-08-15 PROCEDURE — 84484 ASSAY OF TROPONIN QUANT: CPT

## 2019-08-15 PROCEDURE — 80048 BASIC METABOLIC PNL TOTAL CA: CPT

## 2019-08-15 PROCEDURE — 70450 CT HEAD/BRAIN W/O DYE: CPT

## 2019-08-15 PROCEDURE — 93005 ELECTROCARDIOGRAM TRACING: CPT

## 2019-08-15 PROCEDURE — 85025 COMPLETE CBC W/AUTO DIFF WBC: CPT

## 2019-08-15 PROCEDURE — 71045 X-RAY EXAM CHEST 1 VIEW: CPT

## 2019-08-15 NOTE — CAT SCAN REPORT
CT head without contrast



Clinical history: Left arm numbness



FINDINGS: The brain appears to demonstrate appropriate attenuation without significant interval ponce
e from the CT of 5/19/2018. There is again noted incidental calcification involving the falx at. The 
ventricular system appears unchanged in size and configuration. There is no CT evidence of acute intr
acranial hemorrhage or significant mass effect. There is continued mild focal opacification involving
 the visualized medial right sphenoid sinus. All CT scans at this location are performed using the CT
 dose reduction for [employer] by means of automated exposure control.





IMPRESSION:



There is no CT evidence of acute intracranial process.



Signer Name: Joseph Sanchez MD 

Signed: 8/15/2019 9:37 PM

 Workstation Name: [id number]

## 2019-08-15 NOTE — EMERGENCY DEPARTMENT REPORT
ED Chest Pain HPI





- General


Chief Complaint: Chest Pain


Stated Complaint: LFT SIDE TINGLE/CHEST PAIN


Time Seen by Provider: 08/15/19 18:17


Source: patient


Mode of arrival: Ambulatory


Limitations: No Limitations





- History of Present Illness


Initial Comments: 





43 y.o aam presents to ER with chest pain, sharp, for the past four days. 

patient also c/o arm numbness, left for the past month. no n/v, no sob. states 

he had heart cath 4 years ago. no diaphoresis.


Severity scale (0 -10): 8





- Related Data


                                Home Medications











 Medication  Instructions  Recorded  Confirmed  Last Taken


 


cloNIDine [Catapres] 0.2 mg PO BID 05/19/18 08/15/19 Unknown


 


AtorvaSTATin [Lipitor] 20 mg PO DAILY 08/15/19 08/15/19 Unknown


 


Ferrous Sulfate [Iron 325 MG] 325 mg PO BID 08/15/19 08/15/19 Unknown


 


Glimepiride [Amaryl] 4 mg PO DAILY 08/15/19 08/15/19 Unknown


 


Metformin HCl [metFORMIN] 1,000 mg PO BID 08/15/19 08/15/19 Unknown


 


Triamterene-Hctz 75-50 mg Tab 1 tab PO DAILY 08/15/19 08/15/19 Unknown


 


amLODIPine [Norvasc] 10 mg PO DAILY 08/15/19 08/15/19 Unknown








                                  Previous Rx's











 Medication  Instructions  Recorded  Last Taken  Type


 


Cyclobenzaprine [Flexeril] 10 mg PO TID PRN #20 tablet 08/15/19 Unknown Rx











                                    Allergies











Allergy/AdvReac Type Severity Reaction Status Date / Time


 


tomato [Tomato] Allergy  Swelling Verified 08/15/19 14:38














Heart Score





- HEART Score


History: Slightly suspicious


EKG: Normal


Age: < 45


Risk factors: No known risk factors


Troponin: < normal limit


HEART Score: 0





- Critical Actions


Critical Actions: 0-3 pts:0.9-1.7%risk of adverse cardiac event.Candidate for 

discharge





ED Review of Systems


ROS: 


Stated complaint: LFT SIDE TINGLE/CHEST PAIN


Other details as noted in HPI





Comment: All other systems reviewed and negative


Constitutional: denies: see HPI


Eyes: denies: eye pain


ENT: denies: ear pain


Cardiovascular: chest pain


Gastrointestinal: denies: abdominal pain, nausea


Neurological: paresthesias





ED Past Medical Hx





- Past Medical History


Previous Medical History?: Yes


Hx Hypertension: Yes


Hx CVA: Yes


Hx Congestive Heart Failure: No


Hx Diabetes: Yes


Hx GERD: Yes


Hx Headaches / Migraines: Yes


Hx Asthma: No


Hx COPD: No


Additional medical history: sleep apnea, hiatal hernia.  chronic back pain, 

"heart problems"





- Surgical History


Past Surgical History?: Yes


Additional Surgical History: Right upper extremity surgery secondary to trauma, 

cardiac catheterization





- Social History


Smoking Status: Former Smoker


Substance Use Type: None





- Medications


Home Medications: 


                                Home Medications











 Medication  Instructions  Recorded  Confirmed  Last Taken  Type


 


cloNIDine [Catapres] 0.2 mg PO BID 05/19/18 08/15/19 Unknown History


 


AtorvaSTATin [Lipitor] 20 mg PO DAILY 08/15/19 08/15/19 Unknown History


 


Cyclobenzaprine [Flexeril] 10 mg PO TID PRN #20 tablet 08/15/19  Unknown Rx


 


Ferrous Sulfate [Iron 325 MG] 325 mg PO BID 08/15/19 08/15/19 Unknown History


 


Glimepiride [Amaryl] 4 mg PO DAILY 08/15/19 08/15/19 Unknown History


 


Metformin HCl [metFORMIN] 1,000 mg PO BID 08/15/19 08/15/19 Unknown History


 


Triamterene-Hctz 75-50 mg Tab 1 tab PO DAILY 08/15/19 08/15/19 Unknown History


 


amLODIPine [Norvasc] 10 mg PO DAILY 08/15/19 08/15/19 Unknown History














ED Physical Exam





- General


Limitations: No Limitations


General appearance: alert, in no apparent distress





- Head


Head exam: Present: atraumatic, normocephalic





- Eye


Eye exam: Present: normal appearance, PERRL, EOMI


Pupils: Present: normal accommodation





- ENT


ENT exam: Present: normal exam, normal orophraynx





- Respiratory


Respiratory exam: Present: normal lung sounds bilaterally





- Cardiovascular


Cardiovascular Exam: Present: regular rate, normal rhythm





- GI/Abdominal


GI/Abdominal exam: Present: soft, normal bowel sounds





- Neurological Exam


Neurological exam: Present: alert, oriented X3





ED Course


                                   Vital Signs











  08/15/19 08/15/19 08/15/19





  14:57 18:15 21:46


 


Temperature 98.4 F  98.0 F


 


Pulse Rate 101 H  98 H


 


Respiratory 18 20 18





Rate   


 


Blood Pressure 133/85  


 


Blood Pressure  136/96 142/94





[Left]   


 


O2 Sat by Pulse 96 98 99





Oximetry   














ELIU score





- Eliu Score


Age > 65: (0) No


Aspirin use within the Past 7 Days: (0) No


3 or more CAD Risk Factors: (0) No


2 or more Angina events in past 24 hrs: (1) Yes


Known CAD with more than 50% Stenosis: (0) No


Elevated Cardiac Markers: (0) No


ST Deviation Greater than 0.5mm: (0) No


ELIU Score: 1





ED Medical Decision Making





- Lab Data


Result diagrams: 


                                 08/15/19 15:03





                                 08/15/19 15:03





- EKG Data


-: EKG Interpreted by Me


EKG shows normal: sinus rhythm


Rate: normal





- EKG Data


When compared to previous EKG there are: no significant change





- Medical Decision Making





pain is reproducible, trop neg x 3


ct head d/t paresthesia showed no lesions or infarcts, will d/c home f/u with 

pcp, reports to er if symptoms worsens.


Critical care attestation.: 


If time is entered above; I have spent that time in minutes in the direct care 

of this critically ill patient, excluding procedure time.








ED Disposition


Clinical Impression: 


Chest pain


Qualifiers:


 Chest pain type: other chest pain Qualified Code(s): R07.89 - Other chest pain





Disposition: DC-01 TO HOME OR SELFCARE


Is pt being admited?: No


Does the pt Need Aspirin: No


Condition: Stable


Instructions:  Chest Pain (ED)


Prescriptions: 


Cyclobenzaprine [Flexeril] 10 mg PO TID PRN #20 tablet


 PRN Reason: Muscle Spasm


Referrals: 


[facility] [Primary Care Provider] - 3-5 Days

## 2019-08-15 NOTE — XRAY REPORT
CHEST 1 VIEW 



INDICATION:  Chest Pain.



COMPARISON:  4/5/2019



FINDINGS:

Support devices: None. 



Heart: Within normal limits. 

Lungs/Pleura: No acute air space or interstitial disease. 



Additional findings: None.



IMPRESSION:

 No acute findings.



Signer Name: Norman Kimball Jr, MD 

Signed: 8/15/2019 3:31 PM

 Workstation Name: [id number]

## 2019-10-19 ENCOUNTER — HOSPITAL ENCOUNTER (EMERGENCY)
Dept: HOSPITAL 5 - ED | Age: 44
Discharge: HOME | End: 2019-10-19
Payer: SELF-PAY

## 2019-10-19 VITALS — DIASTOLIC BLOOD PRESSURE: 62 MMHG | SYSTOLIC BLOOD PRESSURE: 102 MMHG

## 2019-10-19 DIAGNOSIS — Z91.018: ICD-10-CM

## 2019-10-19 DIAGNOSIS — M25.552: ICD-10-CM

## 2019-10-19 DIAGNOSIS — Z79.82: ICD-10-CM

## 2019-10-19 DIAGNOSIS — G89.29: ICD-10-CM
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
## 2019-10-19 DIAGNOSIS — K21.9: ICD-10-CM

## 2019-10-19 DIAGNOSIS — I10: ICD-10-CM

## 2019-10-19 DIAGNOSIS — G47.30: ICD-10-CM

## 2019-10-19 DIAGNOSIS — E66.01: ICD-10-CM

## 2019-10-19 DIAGNOSIS — S39.92XA: Primary | ICD-10-CM
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
## 2019-10-19 DIAGNOSIS — G43.909: ICD-10-CM

## 2019-10-19 DIAGNOSIS — Z79.899: ICD-10-CM

## 2019-10-19 DIAGNOSIS — E11.9: ICD-10-CM

## 2019-10-19 LAB
BASOPHILS # (AUTO): 0 K/MM3 (ref 0–0.1)
BASOPHILS NFR BLD AUTO: 0.8 % (ref 0–1.8)
BUN SERPL-MCNC: 20 MG/DL (ref 9–20)
BUN/CREAT SERPL: 15 %
CALCIUM SERPL-MCNC: 9.7 MG/DL (ref 8.4–10.2)
EOSINOPHIL # BLD AUTO: 0.1 K/MM3 (ref 0–0.4)
EOSINOPHIL NFR BLD AUTO: 2.2 % (ref 0–4.3)
HCT VFR BLD CALC: 34.5 % (ref 35.5–45.6)
HEMOLYSIS INDEX: 1
HGB BLD-MCNC: 11.4 GM/DL (ref 11.8–15.2)
LYMPHOCYTES # BLD AUTO: 1.3 K/MM3 (ref 1.2–5.4)
LYMPHOCYTES NFR BLD AUTO: 25.8 % (ref 13.4–35)
MCHC RBC AUTO-ENTMCNC: 33 % (ref 32–34)
MCV RBC AUTO: 78 FL (ref 84–94)
MONOCYTES # (AUTO): 0.4 K/MM3 (ref 0–0.8)
MONOCYTES % (AUTO): 7.3 % (ref 0–7.3)
PLATELET # BLD: 307 K/MM3 (ref 140–440)
RBC # BLD AUTO: 4.41 M/MM3 (ref 3.65–5.03)

## 2019-10-19 PROCEDURE — 84484 ASSAY OF TROPONIN QUANT: CPT

## 2019-10-19 PROCEDURE — 80048 BASIC METABOLIC PNL TOTAL CA: CPT

## 2019-10-19 PROCEDURE — 93005 ELECTROCARDIOGRAM TRACING: CPT

## 2019-10-19 PROCEDURE — 93010 ELECTROCARDIOGRAM REPORT: CPT

## 2019-10-19 PROCEDURE — 72125 CT NECK SPINE W/O DYE: CPT

## 2019-10-19 PROCEDURE — 71046 X-RAY EXAM CHEST 2 VIEWS: CPT

## 2019-10-19 PROCEDURE — 85025 COMPLETE CBC W/AUTO DIFF WBC: CPT

## 2019-10-19 PROCEDURE — 36415 COLL VENOUS BLD VENIPUNCTURE: CPT

## 2019-10-19 PROCEDURE — 99285 EMERGENCY DEPT VISIT HI MDM: CPT
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
## 2019-10-19 NOTE — CAT SCAN REPORT
CT cervical spine wo con



INDICATION / CLINICAL INFORMATION:

44 years Male; fall neck pain with radiculopathy.



TECHNIQUE:

Axial CT images of the cervical spine were obtained.  Sagittal and coronal reformatted images were pr
oduced. All CT scans at this location are performed using CT dose reduction for ALARA by means of aut
omated exposure control. Study mildly limited by patient body habitus.



COMPARISON: 

Prior MR - 9/29/2013



FINDINGS:



POST-SURGICAL CHANGES: None.



ALIGNMENT: Normal cervical lordosis seen without significant scoliosis.

VERTEBRAE: No signs of fracture. Vertebral bodies are grossly normal in height throughout.  



There is mild to moderate osseous foraminal narrowing on the right at C4-5 and C5-6 from uncinate hyp
ertrophy. Similar findings seen on the left at these levels, as well as C3-4.



Minimal facet hypertrophy seen at various levels.



INTRAVERTEBRAL DISCS:Disc spaces are fairly well-maintained throughout without significant canal sten
osis. No dominant herniation appreciated.



PARASPINAL SOFT TISSUES: No significant abnormality.



ADDITIONAL FINDINGS: None.



IMPRESSION:

1. No signs of acute bony trauma to the cervical spine.



Signer Name: Madhav Mcclellan MD, III 

Signed: 10/19/2019 12:06 PM

 Workstation Name: [id number]

## 2019-10-19 NOTE — XRAY REPORT
CHEST 2 VIEWS 



INDICATION / CLINICAL INFORMATION:

Chest Pain. 



COMPARISON: 

8/15/2019



FINDINGS:



SUPPORT DEVICES: None.

HEART / MEDIASTINUM: No significant abnormality. 

LUNGS / PLEURA: No significant pulmonary or pleural abnormality. No pneumothorax. 



ADDITIONAL FINDINGS: No significant additional findings.



IMPRESSION:

1. No acute findings. 



Signer Name: Amilcar Camargo MD 

Signed: 10/19/2019 9:30 AM

 Workstation Name: [id number]

## 2019-10-19 NOTE — XRAY REPORT
Right shoulder, 3 views 



INDICATION: Pain following recent fall



FINDINGS: The joint space is maintained. There is no fracture or dislocation. No significant spurring
 or arthritic change. No bone lesion or periostitis. No significant abnormality.



IMPRESSION: Negative study



Signer Name: Amilcar Camargo MD 

Signed: 10/19/2019 11:16 AM

 Workstation Name: [id number]

## 2019-10-19 NOTE — EVENT NOTE
Date of service: 10/19/19


Face to Face: 








This is a 44-year-old gentleman, multiple medical problems, presenting with 

left-sided chest pain, and right-sided paracervical neck pain and right upper 

extremity radicular pain after mechanical fall.  He initially stated that his 

chest pain was present before the fall, and then indicated that his chest pain 

started after the fall.  He reports that he did not hit his head or his neck.  

He reports that he fell, grabbed himself with his arm, and since then, has been 

having right-sided radicular symptoms.  The patient denies DVT, pulmonary 

embolism risk factors, he is low risk by well's criteria, and he is perc 

negative





EKG is morphologically unchanged from prior, and patient appears to be low risk 

for major adverse cardiac event as per the heart score.  In addition, he 

presented to this department in August 2019 for similar symptoms.  The patient 

also corroborates that this is similar to his prior presentation.  This 

institution has an existing protocol whereby patients who are at low risk for 

major adverse cardiac event can be referred to outpatient cardiology for 

expedited risk stratification.





Plan to check two EKGs, two troponins, x-ray of the chest, noncontrast CT scan 

of the cervical spine.  We will also treat his pain.  If diagnostics 

unremarkable, he can follow up with outpatient pain specialist which she already

has for his presumed acute on chronic radicular pain, an outpatient cardiology 

to complete a risk stratification.








                                   Vital Signs











  10/19/19 10/19/19





  08:53 10:04


 


Temperature 97.6 F 97.4 F L


 


Pulse Rate 74 74


 


Respiratory 20 18





Rate  


 


Blood Pressure 114/77 117/72





[Left]  


 


O2 Sat by Pulse 98 99





Oximetry  








                                   Lab Results











  10/19/19 10/19/19 Range/Units





  09:36 09:37 


 


WBC   5.2  (4.5-11.0)  K/mm3


 


RBC   4.41  (3.65-5.03)  M/mm3


 


Hgb   11.4 L  (11.8-15.2)  gm/dl


 


Hct   34.5 L  (35.5-45.6)  %


 


MCV   78 L  (84-94)  fl


 


MCH   26 L  (28-32)  pg


 


MCHC   33  (32-34)  %


 


RDW   15.5 H  (13.2-15.2)  %


 


Plt Count   307  (140-440)  K/mm3


 


Lymph % (Auto)   25.8  (13.4-35.0)  %


 


Mono % (Auto)   7.3  (0.0-7.3)  %


 


Eos % (Auto)   2.2  (0.0-4.3)  %


 


Baso % (Auto)   0.8  (0.0-1.8)  %


 


Lymph #   1.3  (1.2-5.4)  K/mm3


 


Mono #   0.4  (0.0-0.8)  K/mm3


 


Eos #   0.1  (0.0-0.4)  K/mm3


 


Baso #   0.0  (0.0-0.1)  K/mm3


 


Seg Neutrophils %   63.9  (40.0-70.0)  %


 


Seg Neutrophils #   3.3  (1.8-7.7)  K/mm3


 


Sodium  138   (137-145)  mmol/L


 


Potassium  4.6   (3.6-5.0)  mmol/L


 


Chloride  97.5 L   ()  mmol/L


 


Carbon Dioxide  25   (22-30)  mmol/L


 


Anion Gap  20   mmol/L


 


BUN  20   (9-20)  mg/dL


 


Creatinine  1.3   (0.8-1.5)  mg/dL


 


Estimated GFR  > 60   ml/min


 


BUN/Creatinine Ratio  15   %


 


Glucose  161 H   ()  mg/dL


 


Calcium  9.7   (8.4-10.2)  mg/dL


 


Troponin T  < 0.010   (0.00-0.029)  ng/mL

## 2020-01-06 ENCOUNTER — HOSPITAL ENCOUNTER (OUTPATIENT)
Dept: HOSPITAL 5 - ED | Age: 45
Setting detail: OBSERVATION
LOS: 2 days | Discharge: HOME | End: 2020-01-08
Attending: INTERNAL MEDICINE | Admitting: INTERNAL MEDICINE
Payer: COMMERCIAL

## 2020-01-06 DIAGNOSIS — Z79.899: ICD-10-CM

## 2020-01-06 DIAGNOSIS — Z79.82: ICD-10-CM

## 2020-01-06 DIAGNOSIS — Z86.73: ICD-10-CM

## 2020-01-06 DIAGNOSIS — E83.42: ICD-10-CM

## 2020-01-06 DIAGNOSIS — Z88.8: ICD-10-CM

## 2020-01-06 DIAGNOSIS — M54.10: ICD-10-CM

## 2020-01-06 DIAGNOSIS — Z79.4: ICD-10-CM

## 2020-01-06 DIAGNOSIS — Z91.018: ICD-10-CM

## 2020-01-06 DIAGNOSIS — R06.00: ICD-10-CM

## 2020-01-06 DIAGNOSIS — E66.01: ICD-10-CM

## 2020-01-06 DIAGNOSIS — I10: ICD-10-CM

## 2020-01-06 DIAGNOSIS — I63.9: Primary | ICD-10-CM

## 2020-01-06 DIAGNOSIS — E11.9: ICD-10-CM

## 2020-01-06 DIAGNOSIS — K21.9: ICD-10-CM

## 2020-01-06 DIAGNOSIS — G89.29: ICD-10-CM

## 2020-01-06 DIAGNOSIS — M54.9: ICD-10-CM

## 2020-01-06 LAB
APTT BLD: 28.1 SEC. (ref 24.2–36.6)
BASOPHILS # (AUTO): 0 K/MM3 (ref 0–0.1)
BASOPHILS NFR BLD AUTO: 0.8 % (ref 0–1.8)
BILIRUB UR QL STRIP: (no result)
BLOOD UR QL VISUAL: (no result)
BUN SERPL-MCNC: 14 MG/DL (ref 9–20)
BUN SERPL-MCNC: 16 MG/DL (ref 9–20)
BUN/CREAT SERPL: 16 %
BUN/CREAT SERPL: 16 %
CALCIUM SERPL-MCNC: 9.1 MG/DL (ref 8.4–10.2)
CALCIUM SERPL-MCNC: 9.7 MG/DL (ref 8.4–10.2)
EOSINOPHIL # BLD AUTO: 0 K/MM3 (ref 0–0.4)
EOSINOPHIL NFR BLD AUTO: 1.1 % (ref 0–4.3)
HCT VFR BLD CALC: 36.4 % (ref 35.5–45.6)
HEMOLYSIS INDEX: 30
HEMOLYSIS INDEX: 7
HGB BLD-MCNC: 12 GM/DL (ref 11.8–15.2)
INR PPP: 1 (ref 0.87–1.13)
LYMPHOCYTES # BLD AUTO: 1.2 K/MM3 (ref 1.2–5.4)
LYMPHOCYTES NFR BLD AUTO: 26.9 % (ref 13.4–35)
MCHC RBC AUTO-ENTMCNC: 33 % (ref 32–34)
MCV RBC AUTO: 76 FL (ref 84–94)
MONOCYTES # (AUTO): 0.3 K/MM3 (ref 0–0.8)
MONOCYTES % (AUTO): 7.4 % (ref 0–7.3)
MUCOUS THREADS #/AREA URNS HPF: (no result) /HPF
PH UR STRIP: 5 [PH] (ref 5–7)
PLATELET # BLD: 316 K/MM3 (ref 140–440)
RBC # BLD AUTO: 4.78 M/MM3 (ref 3.65–5.03)
RBC #/AREA URNS HPF: 1 /HPF (ref 0–6)
UROBILINOGEN UR-MCNC: < 2 MG/DL (ref ?–2)
WBC #/AREA URNS HPF: 1 /HPF (ref 0–6)

## 2020-01-06 PROCEDURE — 93880 EXTRACRANIAL BILAT STUDY: CPT

## 2020-01-06 PROCEDURE — 93005 ELECTROCARDIOGRAM TRACING: CPT

## 2020-01-06 PROCEDURE — 72148 MRI LUMBAR SPINE W/O DYE: CPT

## 2020-01-06 PROCEDURE — 99284 EMERGENCY DEPT VISIT MOD MDM: CPT

## 2020-01-06 PROCEDURE — 80061 LIPID PANEL: CPT

## 2020-01-06 PROCEDURE — 85670 THROMBIN TIME PLASMA: CPT

## 2020-01-06 PROCEDURE — 85025 COMPLETE CBC W/AUTO DIFF WBC: CPT

## 2020-01-06 PROCEDURE — 83036 HEMOGLOBIN GLYCOSYLATED A1C: CPT

## 2020-01-06 PROCEDURE — 71045 X-RAY EXAM CHEST 1 VIEW: CPT

## 2020-01-06 PROCEDURE — 97163 PT EVAL HIGH COMPLEX 45 MIN: CPT

## 2020-01-06 PROCEDURE — 87086 URINE CULTURE/COLONY COUNT: CPT

## 2020-01-06 PROCEDURE — 85610 PROTHROMBIN TIME: CPT

## 2020-01-06 PROCEDURE — 36415 COLL VENOUS BLD VENIPUNCTURE: CPT

## 2020-01-06 PROCEDURE — 82962 GLUCOSE BLOOD TEST: CPT

## 2020-01-06 PROCEDURE — 70496 CT ANGIOGRAPHY HEAD: CPT

## 2020-01-06 PROCEDURE — 70551 MRI BRAIN STEM W/O DYE: CPT

## 2020-01-06 PROCEDURE — 96365 THER/PROPH/DIAG IV INF INIT: CPT

## 2020-01-06 PROCEDURE — 70498 CT ANGIOGRAPHY NECK: CPT

## 2020-01-06 PROCEDURE — 96375 TX/PRO/DX INJ NEW DRUG ADDON: CPT

## 2020-01-06 PROCEDURE — 96366 THER/PROPH/DIAG IV INF ADDON: CPT

## 2020-01-06 PROCEDURE — 93010 ELECTROCARDIOGRAM REPORT: CPT

## 2020-01-06 PROCEDURE — 96372 THER/PROPH/DIAG INJ SC/IM: CPT

## 2020-01-06 PROCEDURE — 93306 TTE W/DOPPLER COMPLETE: CPT

## 2020-01-06 PROCEDURE — 70450 CT HEAD/BRAIN W/O DYE: CPT

## 2020-01-06 PROCEDURE — 72141 MRI NECK SPINE W/O DYE: CPT

## 2020-01-06 PROCEDURE — 84484 ASSAY OF TROPONIN QUANT: CPT

## 2020-01-06 PROCEDURE — 80048 BASIC METABOLIC PNL TOTAL CA: CPT

## 2020-01-06 PROCEDURE — 85730 THROMBOPLASTIN TIME PARTIAL: CPT

## 2020-01-06 PROCEDURE — 72146 MRI CHEST SPINE W/O DYE: CPT

## 2020-01-06 PROCEDURE — 85652 RBC SED RATE AUTOMATED: CPT

## 2020-01-06 PROCEDURE — 83735 ASSAY OF MAGNESIUM: CPT

## 2020-01-06 PROCEDURE — G0378 HOSPITAL OBSERVATION PER HR: HCPCS

## 2020-01-06 PROCEDURE — 81001 URINALYSIS AUTO W/SCOPE: CPT

## 2020-01-06 PROCEDURE — 82550 ASSAY OF CK (CPK): CPT

## 2020-01-06 RX ADMIN — INSULIN LISPRO SCH UNIT: 100 INJECTION, SOLUTION INTRAVENOUS; SUBCUTANEOUS at 17:30

## 2020-01-06 RX ADMIN — GABAPENTIN SCH MG: 100 CAPSULE ORAL at 21:07

## 2020-01-06 RX ADMIN — FERROUS SULFATE TAB 325 MG (65 MG ELEMENTAL FE) SCH MG: 325 (65 FE) TAB at 21:00

## 2020-01-06 RX ADMIN — INSULIN LISPRO SCH UNIT: 100 INJECTION, SOLUTION INTRAVENOUS; SUBCUTANEOUS at 22:10

## 2020-01-06 RX ADMIN — Medication SCH ML: at 21:01

## 2020-01-06 NOTE — HISTORY AND PHYSICAL REPORT
History of Present Illness


Date of admission: 


01/06/20 12:04





Chief complaint: 





Left-sided weakness and shooting pains in his left lower extremity


History of present illness: 


44-year-old man who presents to the hospital complaining of left-sided weakness 

x3 days and shooting pains down his left leg.  He states that he had a stroke in

the past that affected his left side, but improved than left and with only very 

mild weakness on the left side.  He then states that the weakness on the left 

side got worse.  He had been having neck pain that was radiating down his right 

side, he was wondering if it had switched to the left side.  He denies any 

weakness on the right side.  He denies slurred speech or facial drooping today. 

He denies any trauma.  He states that he was told that he had neuropathy in the 

past but does not know any much further about it.  He also complains that he has

chest pain at times, but he does not have it today he has not had it recently.  

He has chronic exertional shortness of breath however.  He has not been giving a

diagnosis for why he has exertional dyspnea.











[facility] history of hypertension, GERD, chronic back pain, DM, cva





PAST SURGICAL HISTORY: Right arm surgery





SOCIAL HISTORY: Social alcohol, tobacco, no drugs





FAMILY HISTORY: Hypertension





Medications and Allergies


                                    Allergies











Allergy/AdvReac Type Severity Reaction Status Date / Time


 


tomato [Tomato] Allergy  Swelling Verified 08/15/19 14:38











                                Home Medications











 Medication  Instructions  Recorded  Confirmed  Last Taken  Type


 


cloNIDine [Catapres] 0.2 mg PO BID 05/19/18 01/06/20 Unknown History


 


AtorvaSTATin [Lipitor] 20 mg PO DAILY 08/15/19 01/06/20 Unknown History


 


Cyclobenzaprine [Flexeril] 10 mg PO TID PRN #20 tablet 08/15/19 01/06/20 Unknown

 Rx


 


Ferrous Sulfate [Iron 325 MG] 325 mg PO BID 08/15/19 01/06/20 Unknown History


 


Glimepiride [Amaryl] 4 mg PO DAILY 08/15/19 01/06/20 Unknown History


 


Metformin HCl [metFORMIN] 1,000 mg PO BID 08/15/19 01/06/20 Unknown History


 


Triamterene-Hctz 75-50 mg Tab 1 tab PO DAILY 08/15/19 01/06/20 Unknown History


 


amLODIPine [Norvasc] 10 mg PO DAILY 08/15/19 01/06/20 Unknown History


 


Aspirin EC [Halfprin EC] 81 mg PO QDAY #90 tablet. 10/19/19 01/06/20 Unknown 

Rx











Active Meds: 


Active Medications





Amlodipine Besylate (Amlodipine)  10 mg PO DAILY [facility]


Aspirin (Halfprin Ec)  81 mg PO QDAY [facility]


Atorvastatin Calcium (Lipitor)  20 mg PO QHS [facility]


Clonidine HCl (Catapres)  0.2 mg PO BID [facility]


Cyclobenzaprine HCl (Flexeril)  10 mg PO TID PRN


   PRN Reason: Muscle Spasm


Ferrous Sulfate (Feosol)  325 mg PO BID [facility]


Glimepiride (Amaryl)  4 mg PO QAMDIAB [facility]


Insulin Human Lispro (Humalog)  0 unit SUB-Q ACHS [facility]; Protocol


Miscellaneous Medication (Metformin Hcl [Metformin])  1,000 mg PO BID [facility]


Miscellaneous Medication (Triamterene-Hctz 75-50 Mg Tab)  1 tab PO DAILY [facility]











Review of Systems


All systems: negative (See HPI)





Exam





- Constitutional


Vitals: 


                                        











Temp Pulse Resp BP Pulse Ox


 


 97.5 F L  82   18   145/97   98 


 


 01/06/20 08:37  01/06/20 12:00  01/06/20 12:00  01/06/20 12:00  01/06/20 12:00











General appearance: Present: mild distress, well-nourished





- EENT


Eyes: Present: PERRL


ENT: hearing intact, clear oral mucosa





- Neck


Neck: Present: supple, normal ROM





- Respiratory


Respiratory effort: normal


Respiratory: bilateral: CTA





- Cardiovascular


Heart Sounds: Present: S1 & S2.  Absent: rub, click





- Extremities


Extremities: pulses symmetrical, No edema


Peripheral Pulses: within normal limits





- Abdominal


General gastrointestinal: Present: soft, non-tender, non-distended, normal bowel

 sounds


Male genitourinary: Present: normal





- Integumentary


Integumentary: Present: clear, warm, dry





- Musculoskeletal


Musculoskeletal: gait normal, strength equal bilaterally





- Psychiatric


Psychiatric: appropriate mood/affect, intact judgment & insight





- Neurologic


Neurologic: CNII-XII intact, focal deficits (Left sided weakness, but it is 

mild), moves all extremities





Results





- Labs


CBC & Chem 7: 


                                 01/06/20 09:06





                                 01/06/20 09:06


Labs: 


                             Laboratory Last Values











WBC  4.3 K/mm3 (4.5-11.0)  L  01/06/20  09:06    


 


RBC  4.78 M/mm3 (3.65-5.03)   01/06/20  09:06    


 


Hgb  12.0 gm/dl (11.8-15.2)   01/06/20  09:06    


 


Hct  36.4 % (35.5-45.6)   01/06/20  09:06    


 


MCV  76 fl (84-94)  L  01/06/20  09:06    


 


MCH  25 pg (28-32)  L  01/06/20  09:06    


 


MCHC  33 % (32-34)   01/06/20  09:06    


 


RDW  16.3 % (13.2-15.2)  H  01/06/20  09:06    


 


Plt Count  316 K/mm3 (140-440)   01/06/20  09:06    


 


Lymph % (Auto)  26.9 % (13.4-35.0)   01/06/20  09:06    


 


Mono % (Auto)  7.4 % (0.0-7.3)  H  01/06/20  09:06    


 


Eos % (Auto)  1.1 % (0.0-4.3)   01/06/20  09:06    


 


Baso % (Auto)  0.8 % (0.0-1.8)   01/06/20  09:06    


 


Lymph #  1.2 K/mm3 (1.2-5.4)   01/06/20  09:06    


 


Mono #  0.3 K/mm3 (0.0-0.8)   01/06/20  09:06    


 


Eos #  0.0 K/mm3 (0.0-0.4)   01/06/20  09:06    


 


Baso #  0.0 K/mm3 (0.0-0.1)   01/06/20  09:06    


 


Seg Neutrophils %  63.8 % (40.0-70.0)   01/06/20  09:06    


 


Seg Neutrophils #  2.7 K/mm3 (1.8-7.7)   01/06/20  09:06    


 


PT  13.3 Sec. (12.2-14.9)   01/06/20  09:06    


 


INR  1.00  (0.87-1.13)   01/06/20  09:06    


 


APTT  28.1 Sec. (24.2-36.6)   01/06/20  09:06    


 


Thrombin Time  15.9 Sec. (15.1-19.6)   01/06/20  09:06    


 


Sodium  139 mmol/L (137-145)   01/06/20  09:06    


 


Potassium  4.0 mmol/L (3.6-5.0)   01/06/20  09:06    


 


Chloride  101.0 mmol/L ()   01/06/20  09:06    


 


Carbon Dioxide  21 mmol/L (22-30)  L  01/06/20  09:06    


 


Anion Gap  21 mmol/L  01/06/20  09:06    


 


BUN  14 mg/dL (9-20)   01/06/20  09:06    


 


Creatinine  0.9 mg/dL (0.8-1.5)   01/06/20  09:06    


 


Estimated GFR  > 60 ml/min  01/06/20  09:06    


 


BUN/Creatinine Ratio  16 %  01/06/20  09:06    


 


Glucose  215 mg/dL ()  H  01/06/20  09:06    


 


POC Glucose  218  ()  H  01/06/20  16:18    


 


Calcium  9.7 mg/dL (8.4-10.2)   01/06/20  09:06    


 


Magnesium  1.40 mg/dL (1.7-2.3)  L  01/06/20  09:06    


 


Total Creatine Kinase  119 units/L ()   01/06/20  09:06    


 


Troponin T  < 0.010 ng/mL (0.00-0.029)   01/06/20  09:06    














- Imaging and Cardiology


CT Scan - head: image reviewed (No acute findings)





Assessment and Plan


Assessment and plan: 





44-year-old man who presents to the hospital left-sided weakness, shooting pains

 down his left leg.





Subacute CVA?


, neurology consulted,


Events occurred over 3 days ago, no need for permissive hypertension, has 

received aspirin, stroke education, observe for arrhythmia on telemetry, -


--aspirin and high dose statin, check Lipid panel


MRI brain, MRA head, carotid Dopplers, echo


Bedside swallow evaluation was done and patient passed


There is concern of cervical radiculopathy, he had a CT of his cervical spine in

 the past that was negative.  Will obtain MR of cervical spine.-We will give him

 a trial of steroids and pain medications


Shooting pains in his lower extremity may be due to neuropathy or sciatica.  

Trial of gabapentin.


-Obtain UA





Hypomagnesemia; replete IV





Chronic dyspnea on exertion,


-Patient denies chest pain today


Cardiology consult


-Chest x-ray negative


-Of note patient had a normal cath in 2014, normal echo in 2015 per medical 

records.





Diabetes


Consistent carbohydrate diet, sliding scale insulin, check A1c





DVT prophylaxis with SCDs.

## 2020-01-06 NOTE — CAT SCAN REPORT
CT HEAD WITHOUT CONTRAST



INDICATION / CLINICAL INFORMATION:

MAIN: neuro deficits <6hrs or sx present upon awakening  LEFT SIDED WEAKNESS X 3 DAYS HX OF CVA 2004.




TECHNIQUE:

All CT scans at this location are performed using CT dose reduction for ALARA by means of automated e
xposure control. 



COMPARISON:

8/15/2019



FINDINGS:

HEMORRHAGE: No evidence of intracranial hemorrhage or extra-axial fluid collection.

EXTRA-AXIAL SPACES: Cortical sulci, sylvian fissures and basilar cisterns have an unremarkable appear
ance.

VENTRICULAR SYSTEM: The ventricular system is of normal size and configuration.

CEREBRAL PARENCHYMA: No areas of abnormal brain parenchymal attenuation are identified. There is no i
ndication of recent infarction. 

MIDLINE SHIFT OR HERNIATION: There is no mass effect.

CEREBELLUM / BRAINSTEM: Brainstem and cerebellum have an unremarkable appearance.

INTRACRANIAL VESSELS:No abnormalities are identified on this noncontrast head CT.

ORBITS: visualized portions of the orbits have an unremarkable appearance.

SOFT TISSUES of HEAD: No significant abnormality.

CALVARIUM: Evaluation of bone windows reveals no abnormalities.

PARANASAL SINUSES / MASTOID AIR CELLS: Partial opacification of the maxillary sinuses.



ADDITIONAL FINDINGS: None.



IMPRESSION:

1. No acute intracranial abnormality. 

2. Bilateral maxillary sinusitis.



A verbal report was given to Dr. Fisher in the emergency department on 1/6/2020 at 8:24 AM Eastern 
Standard Time.



Signer Name: Herbert Parra MD 

Signed: 1/6/2020 9:27 AM

 Workstation Name: [id number]

## 2020-01-07 LAB
BUN SERPL-MCNC: 10 MG/DL (ref 9–20)
BUN/CREAT SERPL: 14 %
CALCIUM SERPL-MCNC: 8.9 MG/DL (ref 8.4–10.2)
HDLC SERPL-MCNC: 34 MG/DL (ref 40–59)
HEMOLYSIS INDEX: 3

## 2020-01-07 RX ADMIN — GABAPENTIN SCH: 100 CAPSULE ORAL at 06:37

## 2020-01-07 RX ADMIN — OXYCODONE AND ACETAMINOPHEN PRN TAB: 5; 325 TABLET ORAL at 16:08

## 2020-01-07 RX ADMIN — INSULIN LISPRO SCH: 100 INJECTION, SOLUTION INTRAVENOUS; SUBCUTANEOUS at 08:53

## 2020-01-07 RX ADMIN — FERROUS SULFATE TAB 325 MG (65 MG ELEMENTAL FE) SCH MG: 325 (65 FE) TAB at 09:58

## 2020-01-07 RX ADMIN — GABAPENTIN SCH MG: 100 CAPSULE ORAL at 22:41

## 2020-01-07 RX ADMIN — GABAPENTIN SCH MG: 100 CAPSULE ORAL at 15:53

## 2020-01-07 RX ADMIN — OXYCODONE AND ACETAMINOPHEN PRN TAB: 5; 325 TABLET ORAL at 08:54

## 2020-01-07 RX ADMIN — GLIMEPIRIDE SCH: 4 TABLET ORAL at 13:31

## 2020-01-07 RX ADMIN — INSULIN LISPRO SCH: 100 INJECTION, SOLUTION INTRAVENOUS; SUBCUTANEOUS at 13:31

## 2020-01-07 RX ADMIN — FERROUS SULFATE TAB 325 MG (65 MG ELEMENTAL FE) SCH MG: 325 (65 FE) TAB at 22:41

## 2020-01-07 RX ADMIN — Medication SCH ML: at 09:59

## 2020-01-07 RX ADMIN — TRIAMTERENE AND HYDROCHLOROTHIAZIDE SCH EACH: 37.5; 25 TABLET ORAL at 09:57

## 2020-01-07 RX ADMIN — INSULIN LISPRO SCH: 100 INJECTION, SOLUTION INTRAVENOUS; SUBCUTANEOUS at 16:37

## 2020-01-07 RX ADMIN — Medication SCH ML: at 22:44

## 2020-01-07 RX ADMIN — INSULIN LISPRO SCH UNIT: 100 INJECTION, SOLUTION INTRAVENOUS; SUBCUTANEOUS at 22:49

## 2020-01-07 RX ADMIN — ASPIRIN SCH MG: 81 TABLET, COATED ORAL at 09:57

## 2020-01-07 NOTE — MAGNETIC RESONANCE REPORT
MRI CERVICAL SPINE WITHOUT CONTRAST 



INDICATION / CLINICAL INFORMATION:

concern cervical radiculopathy. 



TECHNIQUE:

Multisequence, multiplanar images of the cervical spine were obtained. 



COMPARISON: 

The study is compared to the previous CT cervical spine of 10/19/2019.



FINDINGS:



CRANIOCERVICAL JUNCTION:No significant abnormality.

ALIGNMENT: There is no significant developing spondylolisthesis involving the cervical spine.



VERTEBRAE:There is no edema involving the cervical vertebral bodies. 

VISUALIZED SPINAL CORD: The motion significantly degrades the image quality and appears evaluation of
 the cervical cord at. No definitive epidural collections are appreciated at.. 



LEVEL-BY-LEVEL ANALYSIS:



C2-3: No significant disc abnormality, spinal canal stenosis, or neural foraminal stenosis.

 

C3-4: The broad-based disc bulge appears to minimally flatten the left ventral cord at. The spondylos
is is greater on the left with effacement of the left lateral recess and moderate to marked left neur
al foraminal narrowing. Moderate narrowing is seen on the right.



C4-5: The spondylosis mildly flattens the ventral cord at. Additionally, there is encroachment on the
 lateral recesses with marked left and moderate to marked right neural foraminal narrowing.



C5-6: The spondylosis effaces the ventral subarachnoid space and appears to minimally flatten the zaina
tral cord. There is again encroachment on the lateral recesses with marked neural foraminal narrowing
, greater on the left.



C6-7: There is a slight disc bulge without direct cord compression. There is moderate to neural monica
inal narrowing, greater on the right.



C7-T1: There is a slight disc bulge without direct cord compression. There is mild neural foraminal n
arrowing bilaterally.



PARASPINAL SOFT TISSUES: No significant abnormality.



ADDITIONAL FINDINGS: None.



IMPRESSION:

1. The motion degrades image quality. However, there are multilevel degenerative changes involving th
e cervical spine with posterior spondylosis and significant neural foraminal narrowing as detailed ab
ove.  



Signer Name: Joseph Sanchez MD 

Signed: 1/7/2020 5:21 PM

 Workstation Name: [id number]

## 2020-01-07 NOTE — CONSULTATION
History of Present Illness


Consult date: 01/07/20


Requesting physician: SUSAN BYRNE


Reason for Consult: left sided pain


Chief complaint: 





Left leg pain


History of present illness: 





44-year-old male with a prior history of low back pain and walks with a cane





Started having neck pain with radicular symptoms down the right arm about a erma

h ago without inciting event or trauma and states about 5 days ago without 

inciting events the neck pain persisted but the the pain switched to his left 

side including his arm and leg but today's complaints are mainly about left leg 

pain which she has trouble moving secondary to pain which is hard to understand 

where the pain is coming from first he says it's coming from the neck down the 

left side and then when I ask about the lower back he agrees and says yet coming

down from the lower back so not the best of historians





Patient states she's had a stroke in the past that affected his left side but 

improved without any significant weakness he's had no cortical deficits no other

stroke like deficits involving the face or speech or vision he had no loss of 

consciousness seizures or migraines he's been told he has neuropathy in the past

but that's all he knows about it





Prior history of low back pain diabetes hypertension and stroke





Patient denies any bowel and bladder dysfunction subtle anesthesia loss of anal 

tone





Patient states his walking has been affected





He denies any mid back pain





CT head negative which I reviewed personally and I agree with findings


EKG normal sinus rhythm





No coagulopathy on labs











Most of his symptoms seem to be radicular starting at the neck for starting down

the right side about a month ago that switching to the left side 5 days ago 

without inciting event or trauma or fall now his left side he feels his painful 

all the way down but the most excruciating pain is coming from his left lower 

leg and it's also numb





He states his left leg feels numb but denies numbness in the left arm right arm 

or right leg he also denies a sensory level





Patient denies any headache nausea vomiting double vision vertigo change of 

coordination confusion hallucinations





Review of systems all other systems negative





Family history negative for neurologic disease





Social history no tobacco or drugs





Medications and Allergies


                                    Allergies











Allergy/AdvReac Type Severity Reaction Status Date / Time


 


tomato [Tomato] Allergy  Swelling Verified 08/15/19 14:38











                                Home Medications











 Medication  Instructions  Recorded  Confirmed  Last Taken  Type


 


cloNIDine [Catapres] 0.2 mg PO BID 05/19/18 01/06/20 Unknown History


 


AtorvaSTATin [Lipitor] 20 mg PO DAILY 08/15/19 01/06/20 Unknown History


 


Cyclobenzaprine [Flexeril] 10 mg PO TID PRN #20 tablet 08/15/19 01/06/20 Unknown

 Rx


 


Ferrous Sulfate [Iron 325 MG] 325 mg PO BID 08/15/19 01/06/20 Unknown History


 


Glimepiride [Amaryl] 4 mg PO DAILY 08/15/19 01/06/20 Unknown History


 


Metformin HCl [metFORMIN] 1,000 mg PO BID 08/15/19 01/06/20 Unknown History


 


Triamterene-Hctz 75-50 mg Tab 1 tab PO DAILY 08/15/19 01/06/20 Unknown History


 


amLODIPine [Norvasc] 10 mg PO DAILY 08/15/19 01/06/20 Unknown History


 


Aspirin EC [Halfprin EC] 81 mg PO QDAY #90 tablet. 10/19/19 01/06/20 Unknown 

Rx











Active Meds: 


Active Medications





Acetaminophen (Tylenol)  650 mg PO Q4H PRN


   PRN Reason: Pain MILD(1-3)/Fever >100.5/HA


Amlodipine Besylate (Amlodipine)  10 mg PO DAILY [facility]


Aspirin (Halfprin Ec)  81 mg PO QDAY [facility]


Atorvastatin Calcium (Lipitor)  20 mg PO QHS [facility]


   Last Admin: 01/06/20 22:10 Dose:  20 mg


   Documented by: 


Clonidine HCl (Catapres)  0.2 mg PO BID [facility]


   Last Admin: 01/06/20 21:01 Dose:  0.2 mg


   Documented by: 


Cyclobenzaprine HCl (Flexeril)  10 mg PO TID PRN


   PRN Reason: Muscle Spasm


Dextrose (D50w (25gm) Syringe)  50 ml IV Q30MIN PRN; Protocol


   PRN Reason: Hypoglycemia


Ferrous Sulfate (Feosol)  325 mg PO BID [facility]


   Last Admin: 01/06/20 21:00 Dose:  325 mg


   Documented by: 


Gabapentin (Gabapentin)  100 mg PO Q8HR [facility]


   Last Admin: 01/07/20 06:37 Dose:  Not Given


   Documented by: 


Glimepiride (Amaryl)  4 mg PO QAMDIAB [facility]


Insulin Human Lispro (Humalog)  0 unit SUB-Q ACHS [facility]; Protocol


   Last Admin: 01/07/20 08:53 Dose:  Not Given


   Documented by: 


Lorazepam (Ativan)  1 mg IM ON CALL NR


   Stop: 01/07/20 18:00


Metformin HCl (Glucophage)  100 mg PO BIDDIAB [facility]


Ondansetron HCl (Zofran)  4 mg IV Q8H PRN


   PRN Reason: Nausea And Vomiting


Oxycodone/Acetaminophen (Percocet 5/325)  1 tab PO Q6H PRN


   PRN Reason: Pain, Moderate (4-6)


   Last Admin: 01/07/20 08:54 Dose:  1 tab


   Documented by: 


Sodium Chloride (Sodium Chloride Flush Syringe 10 Ml)  10 ml IV BID [facility]


   Last Admin: 01/06/20 21:01 Dose:  10 ml


   Documented by: 


Sodium Chloride (Sodium Chloride Flush Syringe 10 Ml)  10 ml IV PRN PRN


   PRN Reason: LINE FLUSH


Sodium Chloride (Sodium Chloride Flush Syringe 10 Ml)  10 ml IV PRN PRN


   PRN Reason: LINE FLUSH


Sodium Chloride (Sodium Chloride Flush Syringe 10 Ml)  10 ml IV PRN PRN


   PRN Reason: LINE FLUSH


Triamterene/HCTZ (Maxzide-25)  2 each PO QAM [facility]











Physical Examination





- Vital Signs


Vital Signs: 


                                   Vital Signs











Temp Pulse Resp BP Pulse Ox


 


 97.5 F L  77   16   138/93   95 


 


 01/06/20 08:37  01/06/20 08:37  01/06/20 08:37  01/06/20 08:37  01/06/20 08:37








Awake and alert and oriented 4 no aphasia and no dysarthria no agnosia





No double vision PERRLA VFS EOMI





Tongue is midline no discharge nose or ears





Neck is supple





Abdomen soft skin intact pulses good to all limbs





All cranial nerves tested all cranial nerves are intact.  Sensation over the 

face





No sensory level over the body








In the upper extremities 5 out of 5 strength sensory is intact in the upper 

extremities and the lower extremities right lower extremity 5 out of 5 strength 

of the left lower extremities difficult to assess strengths secondary to pain 

but patient cannot lift the leg off of the bed and decreased light touch 

diffusely down the left leg starting at the upper thigh





Tone seems symmetrical but moving the left leg around is very painful








Reflexes are 1+ in the upper extremity symmetrical but in the lower extremities 

right patellar reflex is about 2/2 but the left patellar reflex seems to be 0/2





No coordination issues with finger-nose in the upper extremities bilateral no 

extra movements no cerebellar signs





Large strong body habitus














Results





- Laboratory Findings


CBC and BMP: 


                                 01/06/20 09:06





                                 01/06/20 20:50


Abnormal Lab Findings: 


                                  Abnormal Labs











  01/06/20 01/06/20 01/06/20





  09:06 09:06 09:06


 


WBC  4.3 L  


 


MCV  76 L  


 


MCH  25 L  


 


RDW  16.3 H  


 


Mono % (Auto)  7.4 H  


 


Sodium   


 


Potassium   


 


Carbon Dioxide   21 L 


 


Glucose   215 H 


 


POC Glucose   


 


Magnesium    1.40 L


 


Ur Specific Gravity   














  01/06/20 01/06/20 01/06/20





  16:18 20:50 21:00


 


WBC   


 


MCV   


 


MCH   


 


RDW   


 


Mono % (Auto)   


 


Sodium   136 L 


 


Potassium   3.4 L 


 


Carbon Dioxide   


 


Glucose   295 H 


 


POC Glucose  218 H  


 


Magnesium   


 


Ur Specific Gravity    1.031 H














  01/06/20 01/07/20





  21:26 07:53


 


WBC  


 


MCV  


 


MCH  


 


RDW  


 


Mono % (Auto)  


 


Sodium  


 


Potassium  


 


Carbon Dioxide  


 


Glucose  


 


POC Glucose  284 H  193 H


 


Magnesium  


 


Ur Specific Gravity  














Assessment and Plan





Concerning for possible radicular compression neuropathy localizing to the neck 

starting with right-sided pain down the arm then switching to the left side now 

his pain is left arm but mainly the left lower extremity which also shows 

weakness and numbness on exam





Differential diagnosis thalamic pain syndrome less likely because to explain 

that  patient would have had a stroke in the left thalamus around October and 

November and then a stroke in the right thalamus 5 days ago , all within 2 month

s





No recent trauma a month ago fall or inciting event to start the pain at the 

neck and go down the right side and when it switched to the left side there was 

no other inciting event or provocation trauma or fall





History of chronic low back pain likely bulging disc disease multi-level





History of stroke left side which was recovering so there is always a concern 

the patient has had a thalamic stroke which can cause a abnormal pain syndrome 

that seeing that pain has switched from the right to the left this is likely 

going to be a structural disease process








No bowel and bladder involvement no loss of anal tone no sensory level no saddle

anesthesia





Reflexes are not symmetrical in the lower extremity








Check MRI brain along with C, T and L-spine.





PT OT








Differential diagnosis includes diabetic amyotrophy or lumbar plexitis


Check H A1c along with lipids








Continued telemetry

## 2020-01-07 NOTE — VASCULAR LAB REPORT
DUPLEX DOPPLER ULTRASOUND CAROTID, BILATERAL



INDICATION:

stroke.



COMPARISON:

None available.



FINDINGS:



RIGHT CAROTID: No significant atherosclerotic plaque.



CCA velocity: 55 cm/sec.

ICA peak systolic velocity: 55 cm/sec.

ICA/CCA PSV Ratio: 1.0.



Right Vertebral Artery: Antegrade flow.



LEFT CAROTID: No significant atherosclerotic plaque.



CCA velocity: 44 cm/sec.

ICA peak systolic velocity: 73 cm/sec.

ICA/CCA PSV Ratio: 1.7.



Left Vertebral Artery: Antegrade flow.



IMPRESSION:

1. Right Internal Carotid Artery: Less than 50% diameter stenosis.

2. Left Internal Carotid Artery: Less than 50% diameter stenosis.







Velocity criteria are extrapolated from diameter data as defined by the Society of Radiologists in Ul
trasound Consensus Conference, Radiology 2003; 229;340-346.



Degree of      ||  ICA PSV  || Plaque           || ICA/CCA 

Stenosis (%) || (cm/sec)   || estimate (%) || PSV Ratio

==========================================

- Normal...............<125..............None.................<2.0  

- <50....................<125..............<50....................<2.0

- 50-69................125-230.........>50....................2.0-4.0

- >70 but <100....>230..............>50....................>4.0

- Near...................High, low, .....visible................variable 

    occlusion            or none 

- Total...................None.............visible;................N/A

     occlusion                               no lumen  

 



Signer Name: Jose Angel Ricks MD 

Signed: 1/7/2020 12:09 PM

 Workstation Name: [id number]

## 2020-01-07 NOTE — DISCHARGE SUMMARY
Providers





- Providers


Date of Admission: 


01/06/20 12:04





Attending physician: 


SUSAN BYRNE MD





                                        





01/06/20


Consult to Cardiac Rehabilitation [CONS] Routine 


   Reason For Exam: Phase I


Consult to Physician [CONS] Routine 


   Comment: 


   Consulting Provider: J CARLOS QUIROZ


   Physician Instructions: 


   Reason For Exam: cp and chf





01/06/20 16:09


Consult to Dietitian/Nutrition [CONS] Routine 


   Physician Instructions: 


   Reason For Exam: Diabetic teaching


   Reason for Consult: Diet education





01/06/20 18:07


Consult to Physician [CONS] Routine 


   Comment: 


   Consulting Provider: VASQUEZ QIU


   Physician Instructions: 


   Reason For Exam: cva


Occupational Therapy Evaluate and Treat [CONS] Routine 


   Comment: 


   Reason For Exam: Neuro deficits


Physical Therapy Evaluation and Treat [CONS] Routine 


   Comment: 


   Reason For Exam: Neuro deficits











Primary care physician: 


[facility], [place]








Hospitalization


Condition: Good


Hospital course: 





44-year-old man who presents to the hospital left-sided weakness, shooting pains

 down his left leg.





Subacute CVA?


, neurology consulted,


Events occurred over 3 days ago, no need for permissive hypertension, has 

received aspirin, stroke education, observe for arrhythmia on telemetry, -


--aspirin and high dose statin, check Lipid panel


MRI brain, MRA head, carotid Dopplers, echo


Bedside swallow evaluation was done and patient passed


There is concern of cervical radiculopathy, he had a CT of his cervical spine in

 the past that was negative.  Will obtain MR of cervical spine.-We will give him

 a trial of steroids and pain medications


Shooting pains in his lower extremity may be due to neuropathy or sciatica.  

Trial of gabapentin.


-Obtain UA





Hypomagnesemia; replete IV





Chronic dyspnea on exertion,


-Patient denies chest pain today


Cardiology consult


-Chest x-ray negative


-Of note patient had a normal cath in 2014, normal echo in 2015 per medical 

records.





Diabetes


Consistent carbohydrate diet, sliding scale insulin, check A1c





DVT prophylaxis with SCDs.


Disposition: DC-01 TO HOME OR SELFCARE


Time spent for discharge: 33 mins





Core Measure Documentation





- Palliative Care


Palliative Care/ Comfort Measures: Not Applicable





- Core Measures


Any of the following diagnoses?: none





Exam





- Constitutional


Vitals: 


                                        











Temp Pulse Resp BP Pulse Ox


 


 97.8 F   62   16   164/107   96 


 


 01/07/20 04:41  01/07/20 09:58  01/07/20 04:41  01/07/20 10:00  01/07/20 08:36











General appearance: Present: no acute distress, well-nourished





- EENT


Eyes: Present: PERRL


ENT: hearing intact, clear oral mucosa





- Neck


Neck: Present: supple, normal ROM





- Respiratory


Respiratory effort: normal


Respiratory: bilateral: CTA





- Cardiovascular


Heart Sounds: Present: S1 & S2.  Absent: rub, click





- Extremities


Extremities: pulses symmetrical, No edema


Peripheral Pulses: within normal limits





- Abdominal


General gastrointestinal: Present: soft, non-tender, non-distended, normal bowel

 sounds


Male genitourinary: Present: normal





- Integumentary


Integumentary: Present: clear, warm, dry





- Musculoskeletal


Musculoskeletal: left sided weakness





- Psychiatric


Psychiatric: appropriate mood/affect, intact judgment & insight





- Neurologic


Neurologic: CNII-XII intact, moves all extremities





Plan


Follow up with: 


[facility], [place] [Primary Care Provider] - 3-5 Days


Prescriptions: 


methylPREDNISolone [Medrol] 4 mg PO BID #10 tablet

## 2020-01-07 NOTE — MAGNETIC RESONANCE REPORT
MRI BRAIN WITHOUT CONTRAST 



INDICATION / CLINICAL INFORMATION:

stroke.



TECHNIQUE:

Multisequence, multiplanar images were obtained.



COMPARISON: 

CT head dated 1/6/2020



FINDINGS:

CEREBRAL and CEREBELLAR HEMISPHERES: There is suggestion of a subtle 5 mm focus of diffusion restrict
ion in the left inferior obed on diffusion image 10. There is decreased signal in this area on the AD
C map. This could represent a subacute ischemic infarct. Please correlate with the patient's clinical
 presentation. No other areas of diffusion restriction are identified. Minimal nonspecific chronic wh
ite matter changes are noted bilaterally. No chronic infarct. No hemorrhage, mass, mass effect or ext
ra-axial fluid collection.

VENTRICLES: Normal in size and configuration for age.  

 

VISUALIZED ORBITS: No significant abnormality.

VISUALIZED PARANASAL SINUSES: Moderate to large mucous retention cysts are noted in the inferior maxi
llary sinuses bilaterally. The remaining sinuses are well-aerated.



ADDITIONAL FINDINGS: None.



IMPRESSION:

Question a tiny subacute ischemic infarct in the left inferior obed as described above.



Signer Name: Norman Kimball Jr, MD 

Signed: 1/7/2020 2:46 PM

 Workstation Name: [id number]

## 2020-01-07 NOTE — CONSULTATION
History of Present Illness


Consult date: 01/07/20


Consult reason: chest pain, congestive heart failure


History of present illness: 





This is a 44-year old male who presents to this hospital with reports of left 

lower extremity numbness and weakness. Patient gives a history of hypertension, 

diabetes, sleep apnea, non compliant with Cpap and prior CVA. He uses a cane to 

assist with ambulation. There is no history of coronary artery disease. In fact,

in 2014 he underwent a cardiac catheterization that shows normal coronaries, 

ejection fraction 65%.





Patient denies chest pain, unusual shortness of breath and palpitations. Head CT

scan reports no acute intracranial abnormalities. Chest x-ray is negative. 12 

lead ECG is benign, normal sinus rhythm.








Medications and Allergies


                                    Allergies











Allergy/AdvReac Type Severity Reaction Status Date / Time


 


tomato [Tomato] Allergy  Swelling Verified 08/15/19 14:38











                                Home Medications











 Medication  Instructions  Recorded  Confirmed  Last Taken  Type


 


cloNIDine [Catapres] 0.2 mg PO BID 05/19/18 01/06/20 Unknown History


 


AtorvaSTATin [Lipitor] 20 mg PO DAILY 08/15/19 01/06/20 Unknown History


 


Cyclobenzaprine [Flexeril] 10 mg PO TID PRN #20 tablet 08/15/19 01/06/20 Unknown

 Rx


 


Ferrous Sulfate [Iron 325 MG] 325 mg PO BID 08/15/19 01/06/20 Unknown History


 


Glimepiride [Amaryl] 4 mg PO DAILY 08/15/19 01/06/20 Unknown History


 


Metformin HCl [metFORMIN] 1,000 mg PO BID 08/15/19 01/06/20 Unknown History


 


Triamterene-Hctz 75-50 mg Tab 1 tab PO DAILY 08/15/19 01/06/20 Unknown History


 


amLODIPine [Norvasc] 10 mg PO DAILY 08/15/19 01/06/20 Unknown History


 


Aspirin EC [Halfprin EC] 81 mg PO QDAY #90 tablet. 10/19/19 01/06/20 Unknown 

Rx











Active Meds: 


Active Medications





Acetaminophen (Tylenol)  650 mg PO Q4H PRN


   PRN Reason: Pain MILD(1-3)/Fever >100.5/HA


Amlodipine Besylate (Amlodipine)  10 mg PO DAILY [facility]


   Last Admin: 01/07/20 09:57 Dose:  10 mg


   Documented by: 


Aspirin (Halfprin Ec)  81 mg PO QDAY [facility]


   Last Admin: 01/07/20 09:57 Dose:  81 mg


   Documented by: 


Atorvastatin Calcium (Lipitor)  20 mg PO QHS [facility]


   Last Admin: 01/06/20 22:10 Dose:  20 mg


   Documented by: 


Clonidine HCl (Catapres)  0.2 mg PO BID [facility]


   Last Admin: 01/07/20 09:58 Dose:  0.2 mg


   Documented by: 


Cyclobenzaprine HCl (Flexeril)  10 mg PO TID PRN


   PRN Reason: Muscle Spasm


Dextrose (D50w (25gm) Syringe)  50 ml IV Q30MIN PRN; Protocol


   PRN Reason: Hypoglycemia


Ferrous Sulfate (Feosol)  325 mg PO BID [facility]


   Last Admin: 01/07/20 09:58 Dose:  325 mg


   Documented by: 


Gabapentin (Gabapentin)  100 mg PO Q8HR [facility]


   Last Admin: 01/07/20 06:37 Dose:  Not Given


   Documented by: 


Glimepiride (Amaryl)  4 mg PO QAMDIAB [facility]


Insulin Human Lispro (Humalog)  0 unit SUB-Q ACHS [facility]; Protocol


   Last Admin: 01/07/20 08:53 Dose:  Not Given


   Documented by: 


Lorazepam (Ativan)  1 mg IM ON CALL NR


   Stop: 01/07/20 18:00


Metformin HCl (Glucophage)  100 mg PO BIDDIAB [facility]


Ondansetron HCl (Zofran)  4 mg IV Q8H PRN


   PRN Reason: Nausea And Vomiting


Oxycodone/Acetaminophen (Percocet 5/325)  1 tab PO Q6H PRN


   PRN Reason: Pain, Moderate (4-6)


   Last Admin: 01/07/20 08:54 Dose:  1 tab


   Documented by: 


Sodium Chloride (Sodium Chloride Flush Syringe 10 Ml)  10 ml IV BID [facility]


   Last Admin: 01/07/20 09:59 Dose:  10 ml


   Documented by: 


Sodium Chloride (Sodium Chloride Flush Syringe 10 Ml)  10 ml IV PRN PRN


   PRN Reason: LINE FLUSH


Sodium Chloride (Sodium Chloride Flush Syringe 10 Ml)  10 ml IV PRN PRN


   PRN Reason: LINE FLUSH


Sodium Chloride (Sodium Chloride Flush Syringe 10 Ml)  10 ml IV PRN PRN


   PRN Reason: LINE FLUSH


Triamterene/HCTZ (Maxzide-25)  2 each PO QAM [facility]


   Last Admin: 01/07/20 09:57 Dose:  2 each


   Documented by: 











Physical Examination


                                   Vital Signs











Temp Pulse Resp BP Pulse Ox


 


 97.5 F L  77   16   138/93   95 


 


 01/06/20 08:37  01/06/20 08:37  01/06/20 08:37  01/06/20 08:37  01/06/20 08:37











General appearance: no acute distress


HEENT: Positive: PERRL


Neck: Positive: trachea midline


Cardiac: Positive: Reg Rate and Rhythm


Lungs: Positive: Decreased Breath Sounds


Neuro: Positive: Grossly Intact, Weakness


Extremities: Absent: edema





Results





                                 01/06/20 09:06





                                 01/06/20 20:50


                          Comprehensive Metabolic Panel











  01/06/20 01/06/20 Range/Units





  09:06 20:50 


 


Sodium  139  136 L  (137-145)  mmol/L


 


Potassium  4.0  3.4 L  (3.6-5.0)  mmol/L


 


Chloride  101.0  98.9  ()  mmol/L


 


Carbon Dioxide  21 L  22  (22-30)  mmol/L


 


BUN  14  16  (9-20)  mg/dL


 


Creatinine  0.9  1.0  (0.8-1.5)  mg/dL


 


Glucose  215 H  295 H  ()  mg/dL


 


Calcium  9.7  9.1  (8.4-10.2)  mg/dL

## 2020-01-08 VITALS — SYSTOLIC BLOOD PRESSURE: 138 MMHG | DIASTOLIC BLOOD PRESSURE: 102 MMHG

## 2020-01-08 RX ADMIN — GABAPENTIN SCH MG: 100 CAPSULE ORAL at 13:42

## 2020-01-08 RX ADMIN — Medication SCH ML: at 21:09

## 2020-01-08 RX ADMIN — FERROUS SULFATE TAB 325 MG (65 MG ELEMENTAL FE) SCH MG: 325 (65 FE) TAB at 21:04

## 2020-01-08 RX ADMIN — INSULIN LISPRO SCH UNIT: 100 INJECTION, SOLUTION INTRAVENOUS; SUBCUTANEOUS at 13:18

## 2020-01-08 RX ADMIN — GABAPENTIN SCH MG: 100 CAPSULE ORAL at 21:04

## 2020-01-08 RX ADMIN — TRIAMTERENE AND HYDROCHLOROTHIAZIDE SCH EACH: 37.5; 25 TABLET ORAL at 13:04

## 2020-01-08 RX ADMIN — Medication SCH ML: at 13:08

## 2020-01-08 RX ADMIN — INSULIN LISPRO SCH: 100 INJECTION, SOLUTION INTRAVENOUS; SUBCUTANEOUS at 13:08

## 2020-01-08 RX ADMIN — GABAPENTIN SCH MG: 100 CAPSULE ORAL at 05:40

## 2020-01-08 RX ADMIN — ASPIRIN SCH MG: 81 TABLET, COATED ORAL at 13:03

## 2020-01-08 RX ADMIN — FERROUS SULFATE TAB 325 MG (65 MG ELEMENTAL FE) SCH MG: 325 (65 FE) TAB at 13:03

## 2020-01-08 RX ADMIN — INSULIN LISPRO SCH: 100 INJECTION, SOLUTION INTRAVENOUS; SUBCUTANEOUS at 18:27

## 2020-01-08 RX ADMIN — GLIMEPIRIDE SCH MG: 4 TABLET ORAL at 13:05

## 2020-01-08 NOTE — MAGNETIC RESONANCE REPORT
MRI THORACIC SPINE WITHOUT CONTRAST 



HISTORY: Abnormal lower extremity reflexes, stroke



COMPARISON: None. 



TECHNIQUE: Multiplane, multisequence non-contrast thoracic spine MRI performed.



CONTRAST: None.



FINDINGS:

Alignment: Normal.

Vertebrae:No significant abnormality.

Visualized cord: No significant abnormality.



T1-2: No significant disc abnormality, spinal canal or neural foraminal stenosis.



T2-3: No significant disc abnormality, spinal canal or neural foraminal stenosis.



T3-4: No significant disc abnormality, spinal canal or neural foraminal stenosis.



T4-5: No significant disc abnormality, spinal canal or neural foraminal stenosis.



T5-6: No significant disc abnormality, spinal canal or neural foraminal stenosis.



T6-7: No significant disc abnormality, spinal canal or neural foraminal stenosis.



T7-8: No significant disc abnormality, spinal canal or neural foraminal stenosis.



T8-9: No significant disc abnormality, spinal canal or neural foraminal stenosis.



T9-10: No significant disc abnormality, spinal canal or neural foraminal stenosis.



T10-11: No significant disc abnormality, spinal canal or neural foraminal stenosis.



T11-12: No significant disc abnormality, spinal canal or neural foraminal stenosis.



Additional findings: None.



IMPRESSION:

No significant abnormality.







MRI LUMBAR SPINE WITHOUT CONTRAST 



HISTORY: Low back pain and left leg numbness and weakness, stroke



COMPARISON: None. 



TECHNIQUE:  Multiplane, multisequence non-contrast lumbar spine MRI performed.



CONTRAST: None.



FINDINGS:

Alignment: Normal.

Vertebrae:No significant abnormality.

Visualized cord: No significant abnormality.  Conus terminates at L1.



L1-2: No significant disc abnormality, spinal canal or neural foraminal stenosis.



L2-3: No significant disc abnormality, spinal canal or neural foraminal stenosis.



L3-4: No significant disc abnormality, spinal canal or neural foraminal stenosis.



L4-5: No significant disc abnormality, spinal canal or neural foraminal stenosis. Mild bilateral face
t arthropathy.



L5-S1: No significant disc abnormality, spinal canal or neural foraminal stenosis. Mild bilateral fac
et arthropathy. Left neural foraminal narrowing is estimated at 50% or greater.



Additional findings: None.



IMPRESSION:

Mild facet arthropathy in the lower lumbar spine. Mild to moderate left neural foraminal narrowing at
 L5-S1.



Signer Name: Norman Kimball Jr, MD 

Signed: 1/8/2020 10:57 AM

 Workstation Name: [id number]

## 2020-01-08 NOTE — CAT SCAN REPORT
CTA neck with and without contrast



CLINICAL HISTORY: Cerebrovascular accident.



Technique: Multiple contiguous postcontrast axial CT images of the neck were obtained at 0.63 mm inte
rvals. 3 plane MIP reconstructions were produced. Precontrast localizing images were also performed. 
All CT scans at this location are performed using the CT dose reduction for ALARA by means of automat
ed exposure control.



FINDINGS: No previous exams are available for comparison. The carotid bifurcations are widely patent.
 There is no significant stenosis involving the internal carotid arteries by NASCET criteria. There i
s some beam hardening artifact. However, the visualized walls of the carotid arteries demonstrate kely
rly smooth contour.



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 The right vertebral artery is
 clearly dominant without significant focal stenosis at. The arch of vessels appear unremarkable.



IMPRESSION:



There is no significant stenosis involving the carotid arteries by NASCET criteria.



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



Signer Name: Joseph Sanchez MD 

Signed: 1/8/2020 11:45 AM

 Workstation Name: [id number]

## 2020-01-08 NOTE — MAGNETIC RESONANCE REPORT
MRI THORACIC SPINE WITHOUT CONTRAST 



HISTORY: Abnormal lower extremity reflexes, stroke



COMPARISON: None. 



TECHNIQUE: Multiplane, multisequence non-contrast thoracic spine MRI performed.



CONTRAST: None.



FINDINGS:

Alignment: Normal.

Vertebrae:No significant abnormality.

Visualized cord: No significant abnormality.



T1-2: No significant disc abnormality, spinal canal or neural foraminal stenosis.



T2-3: No significant disc abnormality, spinal canal or neural foraminal stenosis.



T3-4: No significant disc abnormality, spinal canal or neural foraminal stenosis.



T4-5: No significant disc abnormality, spinal canal or neural foraminal stenosis.



T5-6: No significant disc abnormality, spinal canal or neural foraminal stenosis.



T6-7: No significant disc abnormality, spinal canal or neural foraminal stenosis.



T7-8: No significant disc abnormality, spinal canal or neural foraminal stenosis.



T8-9: No significant disc abnormality, spinal canal or neural foraminal stenosis.



T9-10: No significant disc abnormality, spinal canal or neural foraminal stenosis.



T10-11: No significant disc abnormality, spinal canal or neural foraminal stenosis.



T11-12: No significant disc abnormality, spinal canal or neural foraminal stenosis.



Additional findings: None.



IMPRESSION:

No significant abnormality.







MRI LUMBAR SPINE WITHOUT CONTRAST 



HISTORY: Low back pain and left leg numbness and weakness, stroke



COMPARISON: None. 



TECHNIQUE:  Multiplane, multisequence non-contrast lumbar spine MRI performed.



CONTRAST: None.



FINDINGS:

Alignment: Normal.

Vertebrae:No significant abnormality.

Visualized cord: No significant abnormality.  Conus terminates at L1.



L1-2: No significant disc abnormality, spinal canal or neural foraminal stenosis.



L2-3: No significant disc abnormality, spinal canal or neural foraminal stenosis.



L3-4: No significant disc abnormality, spinal canal or neural foraminal stenosis.



L4-5: No significant disc abnormality, spinal canal or neural foraminal stenosis. Mild bilateral face
t arthropathy.



L5-S1: No significant disc abnormality, spinal canal or neural foraminal stenosis. Mild bilateral fac
et arthropathy. Left neural foraminal narrowing is estimated at 50% or greater.



Additional findings: None.



IMPRESSION:

Mild facet arthropathy in the lower lumbar spine. Mild to moderate left neural foraminal narrowing at
 L5-S1.



Signer Name: Norman Kimball Jr, MD 

Signed: 1/8/2020 10:57 AM

 Workstation Name: [id number]

## 2020-01-08 NOTE — DISCHARGE SUMMARY
Providers





- Providers


Date of Admission: 


01/06/20 12:04





Attending physician: 


MICHAEL FLORES





                                        





01/06/20


Consult to Cardiac Rehabilitation [CONS] Routine 


   Reason For Exam: Phase I





01/06/20 16:09


Consult to Dietitian/Nutrition [CONS] Routine 


   Physician Instructions: 


   Reason For Exam: Diabetic teaching


   Reason for Consult: Diet education





01/06/20 18:07


Consult to Physician [CONS] Routine 


   Comment: 


   Consulting Provider: VASQUEZ QIU


   Physician Instructions: 


   Reason For Exam: cva


Occupational Therapy Evaluate and Treat [CONS] Routine 


   Comment: 


   Reason For Exam: Neuro deficits


Physical Therapy Evaluation and Treat [CONS] Routine 


   Comment: 


   Reason For Exam: Neuro deficits











Primary care physician: 


[facility], [place]








Hospitalization


Condition: Good


Disposition: DC-01 TO HOME OR SELFCARE





Core Measure Documentation





- Palliative Care


Palliative Care/ Comfort Measures: Not Applicable





Exam





- Constitutional


Vitals: 


                                        











Temp Pulse Resp BP Pulse Ox


 


 97.7 F   87   18   153/97   94 


 


 01/08/20 13:14  01/08/20 13:14  01/08/20 13:14  01/08/20 13:14  01/08/20 05:07














Plan


Follow up with: 


[facility] [Primary Care Provider] - 3-5 Days


Prescriptions: 


methylPREDNISolone [Medrol] 4 mg PO BID #10 tablet

## 2020-01-08 NOTE — CAT SCAN REPORT
CTA head with and without IV contrast.



CLINICAL HISTORY: Cerebrovascular accident.



Technique: Multiple contiguous postcontrast CT images of the head were obtained at 0.63 mm intervals.
3 plane MIP reconstructions were obtained. Precontrast localizing images were also performed. CT scan
s at this location are performed using the CT dose reduction for ALAPretty Simple by means of automated exposure
 control.



FINDINGS: There is mild calcification involving the distal internal carotid arteries without signific
ant stenosis by NASCET criteria. There is also no focal narrowing involving the proximal anterior and
 middle cerebral artery ranges.



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 There is no focal stenosis involving 
the basilar artery. There is developmental fetal origin of the left PCA.



There is no CTA evidence of intracranial aneurysm. The dural venous sinuses opacify with contrast. Th
ere are prominent retention cysts within the maxillary and inferior right sphenoid sinuses.



IMPRESSION:



There is mild calcification involving the distal internal carotid arteries without significant stenos
is.



There is developmental fetal origin of the left PCA and hypoplasia of the left vertebral artery.



Signer Name: Joseph Sanchez MD 

Signed: 1/8/2020 11:51 AM

 Workstation Name: [id number]

## 2020-09-02 ENCOUNTER — HOSPITAL ENCOUNTER (EMERGENCY)
Dept: HOSPITAL 5 - ED | Age: 45
LOS: 1 days | Discharge: HOME | End: 2020-09-03
Payer: SELF-PAY

## 2020-09-02 DIAGNOSIS — Z91.018: ICD-10-CM

## 2020-09-02 DIAGNOSIS — Z79.899: ICD-10-CM

## 2020-09-02 DIAGNOSIS — R07.89: Primary | ICD-10-CM

## 2020-09-02 DIAGNOSIS — Z86.73: ICD-10-CM

## 2020-09-02 DIAGNOSIS — R51: ICD-10-CM

## 2020-09-02 DIAGNOSIS — N28.9: ICD-10-CM

## 2020-09-02 DIAGNOSIS — Z79.82: ICD-10-CM

## 2020-09-02 DIAGNOSIS — I10: ICD-10-CM

## 2020-09-02 DIAGNOSIS — E11.9: ICD-10-CM

## 2020-09-02 DIAGNOSIS — K21.9: ICD-10-CM

## 2020-09-02 LAB
BASOPHILS # (AUTO): 0 K/MM3 (ref 0–0.1)
BASOPHILS NFR BLD AUTO: 0.6 % (ref 0–1.8)
BUN SERPL-MCNC: 33 MG/DL (ref 9–20)
BUN/CREAT SERPL: 16 %
CALCIUM SERPL-MCNC: 10.6 MG/DL (ref 8.4–10.2)
EOSINOPHIL # BLD AUTO: 0.1 K/MM3 (ref 0–0.4)
EOSINOPHIL NFR BLD AUTO: 1.3 % (ref 0–4.3)
HCT VFR BLD CALC: 36.4 % (ref 35.5–45.6)
HEMOLYSIS INDEX: 14
HGB BLD-MCNC: 12.4 GM/DL (ref 11.8–15.2)
LYMPHOCYTES # BLD AUTO: 1.5 K/MM3 (ref 1.2–5.4)
LYMPHOCYTES NFR BLD AUTO: 19.1 % (ref 13.4–35)
MCHC RBC AUTO-ENTMCNC: 34 % (ref 32–34)
MCV RBC AUTO: 78 FL (ref 84–94)
MONOCYTES # (AUTO): 0.7 K/MM3 (ref 0–0.8)
MONOCYTES % (AUTO): 8.9 % (ref 0–7.3)
PLATELET # BLD: 268 K/MM3 (ref 140–440)
RBC # BLD AUTO: 4.64 M/MM3 (ref 3.65–5.03)

## 2020-09-02 PROCEDURE — 93005 ELECTROCARDIOGRAM TRACING: CPT

## 2020-09-02 PROCEDURE — 96360 HYDRATION IV INFUSION INIT: CPT

## 2020-09-02 PROCEDURE — 71045 X-RAY EXAM CHEST 1 VIEW: CPT

## 2020-09-02 PROCEDURE — 85025 COMPLETE CBC W/AUTO DIFF WBC: CPT

## 2020-09-02 PROCEDURE — 80048 BASIC METABOLIC PNL TOTAL CA: CPT

## 2020-09-02 PROCEDURE — 36415 COLL VENOUS BLD VENIPUNCTURE: CPT

## 2020-09-02 PROCEDURE — 84484 ASSAY OF TROPONIN QUANT: CPT

## 2020-09-02 NOTE — XRAY REPORT
CHEST 1 VIEW 



INDICATION: 

Chest Pain.



COMPARISON: 

5/21/2020.



FINDINGS:

Support devices: None.



Heart: Within normal limits. 

Lungs/Pleura: No acute air space or interstitial disease. 



Additional findings: None.



IMPRESSION: No acute abnormality.



Signer Name: César Hutson MD 

Signed: 9/2/2020 11:31 PM

Workstation Name: [id number]

## 2020-09-03 VITALS — SYSTOLIC BLOOD PRESSURE: 148 MMHG | DIASTOLIC BLOOD PRESSURE: 96 MMHG

## 2020-09-03 RX ADMIN — LIDOCAINE HYDROCHLORIDE ONE ML: 20 SOLUTION ORAL; TOPICAL at 01:01

## 2020-09-03 NOTE — EMERGENCY DEPARTMENT REPORT
ED General Adult HPI





- General


Chief complaint: Chest Pain


Stated complaint: CHEST PAIN/SPIDER BITE LEFT SIDE


Time Seen by Provider: 09/03/20 00:17


Source: patient


Mode of arrival: Ambulatory


Limitations: No Limitations





- History of Present Illness


Initial comments: 





44-year-old male with history of hypertension, diabetes, presents the ED for 

evaluation.  Patient states he noticed a small quarter size area of swelling on 

his left lower abdomen.  Patient states he is unsure where it came from or how 

long it has been there.  Patient states he thought it may be a spider bite.  

However, patient does not recall feeling anything bite him.  He also denies pain

or itching to the area.  Patient reports after he noticed this area of swelling 

he began having pain diffuse chest pain and headache while at rest.  He denies 

any aggravating or alleviating factors.  He also reports some associated mild 

shortness of breath that has now resolved.  Patient denies fever, nausea or 

vomiting, diaphoresis, leg swelling.  Patient states that chest pain is across 

his entire chest, unable to characterize the pain.  Pain is nonradiating.  

Patient states he decided to come to the emergency room because he was unsure if

he may have "poison going through my veins" from a possible spider bite.


-: hour(s) (3)


Location: head, chest, abdomen


Radiation: non-radiation


Quality: other (Unable to characterize the pain)


Consistency: constant


Improves with: none


Worsens with: none


Associated Symptoms: chest pain, cough, headaches, shortness of breath.  denies:

fever/chills, nausea/vomiting





- Related Data


                                Home Medications











 Medication  Instructions  Recorded  Confirmed  Last Taken


 


cloNIDine [Catapres] 0.2 mg PO BID 05/19/18 01/06/20 Unknown


 


AtorvaSTATin [Lipitor] 20 mg PO DAILY 08/15/19 01/06/20 Unknown


 


Ferrous Sulfate [Iron 325 MG] 325 mg PO BID 08/15/19 01/06/20 Unknown


 


Glimepiride [Amaryl] 4 mg PO DAILY 08/15/19 01/06/20 Unknown


 


Metformin HCl [metFORMIN] 1,000 mg PO BID 08/15/19 01/06/20 Unknown


 


Triamterene-Hctz 75-50 mg Tab 1 tab PO DAILY 08/15/19 01/06/20 Unknown


 


amLODIPine 10 mg PO DAILY 08/15/19 01/06/20 Unknown








                                  Previous Rx's











 Medication  Instructions  Recorded  Last Taken  Type


 


Cyclobenzaprine [Flexeril 10 MG 10 mg PO TID PRN #20 tablet 08/15/19 Unknown Rx





TAB]    


 


Aspirin EC [Halfprin EC] 81 mg PO QDAY #90 tablet. 10/19/19 Unknown Rx


 


methylPREDNISolone [Medrol] 4 mg PO BID #10 tablet 01/07/20 Unknown Rx


 


Pregabalin [Lyrica] 100 mg PO BID #60 capsule 01/08/20 Unknown Rx


 


Butalb/Acetamin/Caff -40 1 tab PO Q6HR PRN #10 tab 09/03/20 Unknown Rx





[Fioricet]    











                                    Allergies











Allergy/AdvReac Type Severity Reaction Status Date / Time


 


tomato [Tomato] Allergy  Swelling Verified 08/15/19 14:38














ED Review of Systems


ROS: 


Stated complaint: CHEST PAIN/SPIDER BITE LEFT SIDE


Other details as noted in HPI





Comment: All other systems reviewed and negative


Constitutional: denies: chills, fever


Respiratory: cough, shortness of breath


Cardiovascular: chest pain


Gastrointestinal: denies: abdominal pain, nausea, vomiting


Skin: as per HPI


Neurological: headache





ED Past Medical Hx





- Past Medical History


Previous Medical History?: Yes


Hx Hypertension: Yes


Hx CVA: Yes


Hx Congestive Heart Failure: No


Hx Diabetes: Yes


Hx GERD: Yes


Hx Headaches / Migraines: Yes


Hx Asthma: No


Hx COPD: No


Additional medical history: sleep apnea, hiatal hernia.  chronic back pain, 

"heart problems"





- Surgical History


Additional Surgical History: Right upper extremity surgery secondary to trauma, 

cardiac catheterization





- Social History


Smoking Status: Never Smoker


Substance Use Type: Alcohol





- Medications


Home Medications: 


                                Home Medications











 Medication  Instructions  Recorded  Confirmed  Last Taken  Type


 


cloNIDine [Catapres] 0.2 mg PO BID 05/19/18 01/06/20 Unknown History


 


AtorvaSTATin [Lipitor] 20 mg PO DAILY 08/15/19 01/06/20 Unknown History


 


Cyclobenzaprine [Flexeril 10 MG 10 mg PO TID PRN #20 tablet 08/15/19 01/06/20 

Unknown Rx





TAB]     


 


Ferrous Sulfate [Iron 325 MG] 325 mg PO BID 08/15/19 01/06/20 Unknown History


 


Glimepiride [Amaryl] 4 mg PO DAILY 08/15/19 01/06/20 Unknown History


 


Metformin HCl [metFORMIN] 1,000 mg PO BID 08/15/19 01/06/20 Unknown History


 


Triamterene-Hctz 75-50 mg Tab 1 tab PO DAILY 08/15/19 01/06/20 Unknown History


 


amLODIPine 10 mg PO DAILY 08/15/19 01/06/20 Unknown History


 


Aspirin EC [Halfprin EC] 81 mg PO QDAY #90 tablet. 10/19/19 01/06/20 Unknown 

Rx


 


methylPREDNISolone [Medrol] 4 mg PO BID #10 tablet 01/07/20  Unknown Rx


 


Pregabalin [Lyrica] 100 mg PO BID #60 capsule 01/08/20  Unknown Rx


 


Butalb/Acetamin/Caff -40 1 tab PO Q6HR PRN #10 tab 09/03/20  Unknown Rx





[Fioricet]     














ED Physical Exam





- General


Limitations: No Limitations


General appearance: alert, in no apparent distress





- Head


Head exam: Present: atraumatic, normocephalic





- Eye


Eye exam: Present: normal appearance, EOMI





- ENT


ENT exam: Present: normal exam





- Neck


Neck exam: Present: normal inspection





- Respiratory


Respiratory exam: Present: normal lung sounds bilaterally.  Absent: respiratory 

distress





- Cardiovascular


Cardiovascular Exam: Present: regular rate, normal rhythm





- GI/Abdominal


GI/Abdominal exam: Present: soft, other (Small quarter sized raised area of mild

 swelling on the left abdominal wall, nontender, no erythema, no induration, no 

fluctuance, no bruising present).  Absent: distended, tenderness





- Extremities Exam


Extremities exam: Present: normal inspection.  Absent: pedal edema, calf 

tenderness





- Neurological Exam


Neurological exam: Present: alert, oriented X3





- Psychiatric


Psychiatric exam: Present: normal affect, normal mood





- Skin


Skin exam: Present: warm, dry, intact, normal color





ED Course


                                   Vital Signs











  09/03/20 09/03/20 09/03/20





  00:23 00:30 00:45


 


Pulse Rate  98 H 94 H


 


Respiratory  16 20





Rate   


 


Blood Pressure 128/96 136/89 140/93


 


O2 Sat by Pulse 97 94 95





Oximetry   














  09/03/20 09/03/20





  01:00 01:15


 


Pulse Rate 93 H 94 H


 


Respiratory 17 20





Rate  


 


Blood Pressure 124/85 138/96


 


O2 Sat by Pulse 93 96





Oximetry  














ED Medical Decision Making





- Lab Data


Result diagrams: 


                                 09/02/20 23:05





                                 09/02/20 23:05





- EKG Data


-: EKG Interpreted by Me


EKG shows normal: sinus rhythm, axis, intervals, QRS complexes, ST-T waves


Rate: tachycardia (rate 105)





- Radiology Data


Radiology results: report reviewed, image reviewed





- Medical Decision Making





44-year-old male with chest pain, headache.  EKG unremarkable, no ST changes.  

Troponin is negative x2.  No neuro deficits on exam.  Lesion on abdomen does not

 appear to be infection.  Labs unremarkable except for some renal insufficiency.

  However patient does have history of hypertension and diabetes, so this may 

contribute.  Patient was still given 1 L bolus of IV fluids.  He has been 

advised to allergy.  Information will be given.  Patient is feeling much better 

at this time following GI cocktail and Fioricet.  He reports resolution of chest

 pain and headache.  Vital signs stable.  Patient will be discharged at this 

time.  Return precautions given.


Critical care attestation.: 


If time is entered above; I have spent that time in minutes in the direct care 

of this critically ill patient, excluding procedure time.








ED Disposition


Clinical Impression: 


 Chest pain, Acute headache, Renal insufficiency





Disposition: DC-01 TO HOME OR SELFCARE


Is pt being admited?: No


Condition: Stable


Instructions:  Chest Pain (ED), Acute Headache (ED), Impaired Kidney Function 

(ED)


Referrals: 


PRIMARY MD LORENZO [Primary Care Provider] - 3-5 Days


NEHEMIAS WHALEY MD [Staff Physician] - 3-5 Days


[facility] [Provider Group] - 3-5 Days


Time of Disposition: 02:32

## 2020-10-23 NOTE — PROGRESS NOTE
Assessment and Plan





Left lower extremity weakness


Prior CVA


Hypertension


Sleep apnea


   noncompliant with Cpap


Diabetes





An echocardiogram this admission reports normal LV systolic function, EF 55-60%.

Bubble study is negative.


2014 [facility]: normal coronaries, ejection fraction 65%.





Recommendations:


No active cardiac issues are demonstrated on clinical assessment, ECGs, 

laboratory values or chest x-ray.


We will sign off.





Subjective


Date of service: 01/08/20


Interval history: 





Patient is resting in bed comfortably. No cardiac complaints.





Objective


                                   Vital Signs











  Temp Pulse Resp BP Pulse Ox


 


 01/08/20 05:07  97.4 F L  69  20  127/79  94


 


 01/07/20 22:44  98.8 F  69  20  140/95  96


 


 01/07/20 22:41   70   140/95 


 


 01/07/20 15:54  97.7 F  81  15  138/99  97


 


 01/07/20 10:09     162/105 


 


 01/07/20 10:00     164/107 


 


 01/07/20 09:58   62   149/105 


 


 01/07/20 09:57   62   149/105 














- Physical Examination


General: No Apparent Distress


HEENT: Positive: PERRL


Neck: Positive: trachea midline


Cardiac: Positive: Reg Rate and Rhythm


Lungs: Positive: Decreased Breath Sounds


Neuro: Positive: Grossly Intact, Weakness


Extremities: Absent: edema





- Labs and Meds


                                     Lipids











  01/07/20 Range/Units





  10:48 


 


Triglycerides  136  (2-149)  mg/dL


 


Cholesterol  134  ()  mg/dL


 


HDL Cholesterol  34 L  (40-59)  mg/dL


 


Cholesterol/HDL Ratio  3.94  %








                          Comprehensive Metabolic Panel











  01/07/20 Range/Units





  10:48 


 


Sodium  138  (137-145)  mmol/L


 


Potassium  3.8  (3.6-5.0)  mmol/L


 


Chloride  101.5  ()  mmol/L


 


Carbon Dioxide  22  (22-30)  mmol/L


 


BUN  10  (9-20)  mg/dL


 


Creatinine  0.7 L  (0.8-1.5)  mg/dL


 


Glucose  200 H  ()  mg/dL


 


Calcium  8.9  (8.4-10.2)  mg/dL
Assessment and Plan





stroke acute/subacute isch  L castillo, basilar perforators thrombus, lacunar, 

stable


this would possibly account for R sided sx pt had over the past few weeks that 

have now resolved


   less likely cause of central pain syndrome


CTA h/n


echo neg for source or pfo


carotids neg for high grade stenosis


no recurrent strokes


no a.fib 





anti plt


statin 


bp control 


bg control 


tele 








2. L sided > R sided radiculopathy like sx (resolved but right sided weakness 

may have been d/t prob 1. CASTILLO stroke ), neck pain, numb weak L UE and painful 

numb weak LLE, stable


MRI c spine reviewed


ayse d/t degen. disc dis. buldging disc dis of cervical spine, structural dis 

process, multilevel


this explains the patients radicular sx down the L and expected R side, though 

recent stroke uncovered may have been a contributing factor of weakness


   radiculopathy / compression neuropathy both central canal / cord and 

peripheral nerves


will need neurosurg or ortho spine f/u 


avoid lifting above 15 lbs


pain control 





 LBP, L LE pain numb weakness, asymm reflexes, and subjective urinary issues, 

stable 


T and L spine MRI pending, no sensory level 


I am expecting similar structural dis findings in the lumbar spine as in the c 

spine


   radiculopathy / compression neuropathy of peripheral nerves





PT OT


fall precautions


pain control





no cause of central pain syndrome seen on the brain 


pt c spine findings would be expected to cause pain and discomfort and can 

progress to worsening weakness and sensory deficits / along w b/bl dysfunction 





Subjective


Date of service: 01/08/20


Principal diagnosis: L sided weakness


Interval history: 





no events overnight


no neuro decline





still c/o L sided weakness numbness mainly L LE, w pain associated from neck 

down the L side





no loc


no sz


no fever





no b/bl changes


no saddle anesthesia





no HA


no n/v





still c/o of neck pain w radic sx down mainly the L side


   still c/o LBP w L LE pain and weakness and numbness





MRI b sugg of CASTILLO stroke on the left , I agree there is diff/adc correlate , 

small basilar stroke 





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 





MRI T , L pending








ECHO no source of emboli, no pfo


Carotids: no high grade stenosis 





asa and statin on board








Objective





- Exam


Narrative Exam: 





Awake and alert and oriented 4 no aphasia 


 no dysarthria no agnosia





No double vision


 PERRLA VFF EOMI





Tongue is midline 





Neck is supple








All cranial nerves tested all cranial nerves are intact


No sensory level over the body








In the upper extremities 5 out of 5 strength sensory is intact 


 and the lower extremities right lower extremity 5 out of 5 strength





 left lower extremities difficult to assess strengths secondary to pain but pat

ient cannot lift the leg off of the bed 





decreased light touch diffusely down the left leg starting at the upper thigh





Tone seems symmetrical but moving the left leg around is very painful





Reflexes are 1+ in the upper extremity symmetrical but in the lower extremities 

right patellar reflex is about 2/2 but the left patellar reflex seems to be 0/2











- Vital Sign


                               Vital Signs - 12hr











  01/07/20 01/07/20 01/08/20





  22:41 22:44 05:07


 


Temperature  98.8 F 97.4 F L


 


Pulse Rate 70 69 69


 


Respiratory  20 20





Rate   


 


Blood Pressure 140/95 140/95 127/79


 


O2 Sat by Pulse  96 94





Oximetry   














- Laboratory Findings


CBC and BMP: 


                                 01/06/20 09:06





                                 01/07/20 10:48


Abnormal Lab Findings: 


                                  Abnormal Labs











  01/06/20 01/06/20 01/06/20





  09:06 09:06 09:06


 


WBC  4.3 L  


 


MCV  76 L  


 


MCH  25 L  


 


RDW  16.3 H  


 


Mono % (Auto)  7.4 H  


 


Sodium   


 


Potassium   


 


Carbon Dioxide   21 L 


 


Creatinine   


 


Glucose   215 H 


 


POC Glucose   


 


Hemoglobin A1c   


 


Magnesium    1.40 L


 


HDL Cholesterol   


 


Ur Specific Gravity   














  01/06/20 01/06/20 01/06/20





  16:18 20:50 21:00


 


WBC   


 


MCV   


 


MCH   


 


RDW   


 


Mono % (Auto)   


 


Sodium   136 L 


 


Potassium   3.4 L 


 


Carbon Dioxide   


 


Creatinine   


 


Glucose   295 H 


 


POC Glucose  218 H  


 


Hemoglobin A1c   


 


Magnesium   


 


HDL Cholesterol   


 


Ur Specific Gravity    1.031 H














  01/06/20 01/07/20 01/07/20





  21:26 07:53 10:48


 


WBC   


 


MCV   


 


MCH   


 


RDW   


 


Mono % (Auto)   


 


Sodium   


 


Potassium   


 


Carbon Dioxide   


 


Creatinine    0.7 L


 


Glucose    200 H


 


POC Glucose  284 H  193 H 


 


Hemoglobin A1c   


 


Magnesium   


 


HDL Cholesterol    34 L


 


Ur Specific Gravity   














  01/07/20 01/07/20 01/07/20





  10:48 16:24 22:56


 


WBC   


 


MCV   


 


MCH   


 


RDW   


 


Mono % (Auto)   


 


Sodium   


 


Potassium   


 


Carbon Dioxide   


 


Creatinine   


 


Glucose   


 


POC Glucose   161 H  221 H


 


Hemoglobin A1c  10.2 H  


 


Magnesium   


 


HDL Cholesterol   


 


Ur Specific Gravity   














  01/08/20





  08:09


 


WBC 


 


MCV 


 


MCH 


 


RDW 


 


Mono % (Auto) 


 


Sodium 


 


Potassium 


 


Carbon Dioxide 


 


Creatinine 


 


Glucose 


 


POC Glucose  155 H


 


Hemoglobin A1c 


 


Magnesium 


 


HDL Cholesterol 


 


Ur Specific Gravity
Subjective


Date of service: 01/08/20


Principal diagnosis: L sided weakness


Interval history: 


pt seen again, w/u CTA h/n and Lumbar Thoracic MRI reviewed





Exam does not show any sixth or seventh nerve palsy I'm not picking up any 

cerebellar findings on the left


And sensory exam is inconsistent, pt states his left leg is numb, but when I 

retest his L leg w LT and pp, he says denies any deficits, but when I ask is the

L leg and R leg the same in term of sensory he say no


   sensory discrepancy may be d/t colloquial communications , pt in the end 

still feels my testing was different in the L leg  





MR T and L are neg for advanced canal stenosis or compression 

neuropathy/myelopathy 





CTa H/n are neg for high grade stenosis of post circulation, note the fetal 

orgin PCA





no b/bl dysfunction 


no recurrent strokes


no a.fib





glu: remain elevated during stay


HA1c 10+





---


L sided sx are likely localizing to c spine buldging disc dis/compression 

myelopathy and neuropathy (radiculopathy)





L lower extremity sx are out of synchrony/not correlating to MRI T L findings, 

leaving the poss. of diabetic amyotrophy vs C spine compression, though no 

weight loss or autonomic dysfunction as best I can tell to support DM amyo. 


there was however a spread from R LE to L LE , what complicates things are 1., L

obed stroke that wouldnt cause LE pain, 2. 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 

multilevel





neuro w/u is complete


echo CTAs h/n tele MRI b carotids all complete , a fib, no central source , L 

obed sub acute stroke,  no pfo, no post circu. stenosis 





C T L mri spine : shows need for f/u w [facility] vs Spine ortho for possible 

intervention 





pain control 


stroke risk reducation mearsure , antiplt and statin 


tight BP control 


very tight BG control





f/u EMG NCS 








some retrosp.studies but not all have shown benefit from steroids course





I will start lyrica 100 bid can go up to 200 bid in one week 





analgesia


PT OT





f/u w neuro 


f/u w endocrine 











Objective





- Vital Sign


                               Vital Signs - 12hr











  01/08/20 01/08/20 01/08/20





  05:07 13:02 13:03


 


Temperature 97.4 F L  


 


Pulse Rate 69 87 87


 


Respiratory 20  





Rate   


 


Blood Pressure 127/79 153/97 153/97


 


Blood Pressure   





[Left]   


 


O2 Sat by Pulse 94  





Oximetry   














  01/08/20





  13:14


 


Temperature 97.7 F


 


Pulse Rate 87


 


Respiratory 18





Rate 


 


Blood Pressure 


 


Blood Pressure 153/97





[Left] 


 


O2 Sat by Pulse 





Oximetry 














- Laboratory Findings


CBC and BMP: 


                                 01/06/20 09:06





                                 01/07/20 10:48


Abnormal Lab Findings: 


                                  Abnormal Labs











  01/06/20 01/06/20 01/06/20





  09:06 09:06 09:06


 


WBC  4.3 L  


 


MCV  76 L  


 


MCH  25 L  


 


RDW  16.3 H  


 


Mono % (Auto)  7.4 H  


 


Sodium   


 


Potassium   


 


Carbon Dioxide   21 L 


 


Creatinine   


 


Glucose   215 H 


 


POC Glucose   


 


Hemoglobin A1c   


 


Magnesium    1.40 L


 


HDL Cholesterol   


 


Ur Specific Gravity   














  01/06/20 01/06/20 01/06/20





  16:18 20:50 21:00


 


WBC   


 


MCV   


 


MCH   


 


RDW   


 


Mono % (Auto)   


 


Sodium   136 L 


 


Potassium   3.4 L 


 


Carbon Dioxide   


 


Creatinine   


 


Glucose   295 H 


 


POC Glucose  218 H  


 


Hemoglobin A1c   


 


Magnesium   


 


HDL Cholesterol   


 


Ur Specific Gravity    1.031 H














  01/06/20 01/07/20 01/07/20





  21:26 07:53 10:48


 


WBC   


 


MCV   


 


MCH   


 


RDW   


 


Mono % (Auto)   


 


Sodium   


 


Potassium   


 


Carbon Dioxide   


 


Creatinine    0.7 L


 


Glucose    200 H


 


POC Glucose  284 H  193 H 


 


Hemoglobin A1c   


 


Magnesium   


 


HDL Cholesterol    34 L


 


Ur Specific Gravity   














  01/07/20 01/07/20 01/07/20





  10:48 16:24 22:56


 


WBC   


 


MCV   


 


MCH   


 


RDW   


 


Mono % (Auto)   


 


Sodium   


 


Potassium   


 


Carbon Dioxide   


 


Creatinine   


 


Glucose   


 


POC Glucose   161 H  221 H


 


Hemoglobin A1c  10.2 H  


 


Magnesium   


 


HDL Cholesterol   


 


Ur Specific Gravity   














  01/08/20 01/08/20





  08:09 13:23


 


WBC  


 


MCV  


 


MCH  


 


RDW  


 


Mono % (Auto)  


 


Sodium  


 


Potassium  


 


Carbon Dioxide  


 


Creatinine  


 


Glucose  


 


POC Glucose  155 H  218 H


 


Hemoglobin A1c  


 


Magnesium  


 


HDL Cholesterol  


 


Ur Specific Gravity
no

## 2021-02-05 ENCOUNTER — HOSPITAL ENCOUNTER (INPATIENT)
Dept: HOSPITAL 5 - ED | Age: 46
LOS: 3 days | Discharge: HOME | DRG: 392 | End: 2021-02-08
Attending: INTERNAL MEDICINE | Admitting: INTERNAL MEDICINE
Payer: COMMERCIAL

## 2021-02-05 DIAGNOSIS — M54.9: ICD-10-CM

## 2021-02-05 DIAGNOSIS — G89.29: ICD-10-CM

## 2021-02-05 DIAGNOSIS — E66.01: ICD-10-CM

## 2021-02-05 DIAGNOSIS — I10: ICD-10-CM

## 2021-02-05 DIAGNOSIS — Z91.018: ICD-10-CM

## 2021-02-05 DIAGNOSIS — E11.9: ICD-10-CM

## 2021-02-05 DIAGNOSIS — Z86.73: ICD-10-CM

## 2021-02-05 DIAGNOSIS — K21.9: Primary | ICD-10-CM

## 2021-02-05 DIAGNOSIS — E78.5: ICD-10-CM

## 2021-02-05 DIAGNOSIS — G43.909: ICD-10-CM

## 2021-02-05 DIAGNOSIS — G47.33: ICD-10-CM

## 2021-02-05 LAB
BASOPHILS # (AUTO): 0.1 K/MM3 (ref 0–0.1)
BASOPHILS NFR BLD AUTO: 0.9 % (ref 0–1.8)
BUN SERPL-MCNC: 23 MG/DL (ref 9–20)
BUN/CREAT SERPL: 18 %
CALCIUM SERPL-MCNC: 10.2 MG/DL (ref 8.4–10.2)
EOSINOPHIL # BLD AUTO: 0.1 K/MM3 (ref 0–0.4)
EOSINOPHIL NFR BLD AUTO: 2.3 % (ref 0–4.3)
HCT VFR BLD CALC: 37.3 % (ref 35.5–45.6)
HEMOLYSIS INDEX: 4
HGB BLD-MCNC: 12.2 GM/DL (ref 11.8–15.2)
LYMPHOCYTES # BLD AUTO: 1.8 K/MM3 (ref 1.2–5.4)
LYMPHOCYTES NFR BLD AUTO: 27.6 % (ref 13.4–35)
MCHC RBC AUTO-ENTMCNC: 33 % (ref 32–34)
MCV RBC AUTO: 79 FL (ref 84–94)
MONOCYTES # (AUTO): 0.5 K/MM3 (ref 0–0.8)
MONOCYTES % (AUTO): 7.8 % (ref 0–7.3)
PLATELET # BLD: 275 K/MM3 (ref 140–440)
RBC # BLD AUTO: 4.71 M/MM3 (ref 3.65–5.03)

## 2021-02-05 PROCEDURE — 80307 DRUG TEST PRSMV CHEM ANLYZR: CPT

## 2021-02-05 PROCEDURE — 82962 GLUCOSE BLOOD TEST: CPT

## 2021-02-05 PROCEDURE — A9502 TC99M TETROFOSMIN: HCPCS

## 2021-02-05 PROCEDURE — 83036 HEMOGLOBIN GLYCOSYLATED A1C: CPT

## 2021-02-05 PROCEDURE — 85025 COMPLETE CBC W/AUTO DIFF WBC: CPT

## 2021-02-05 PROCEDURE — 93306 TTE W/DOPPLER COMPLETE: CPT

## 2021-02-05 PROCEDURE — 93005 ELECTROCARDIOGRAM TRACING: CPT

## 2021-02-05 PROCEDURE — 36415 COLL VENOUS BLD VENIPUNCTURE: CPT

## 2021-02-05 PROCEDURE — 80048 BASIC METABOLIC PNL TOTAL CA: CPT

## 2021-02-05 PROCEDURE — 71045 X-RAY EXAM CHEST 1 VIEW: CPT

## 2021-02-05 PROCEDURE — 84484 ASSAY OF TROPONIN QUANT: CPT

## 2021-02-05 PROCEDURE — 80061 LIPID PANEL: CPT

## 2021-02-05 PROCEDURE — 78452 HT MUSCLE IMAGE SPECT MULT: CPT

## 2021-02-05 PROCEDURE — 85610 PROTHROMBIN TIME: CPT

## 2021-02-05 PROCEDURE — 93017 CV STRESS TEST TRACING ONLY: CPT

## 2021-02-05 PROCEDURE — G0378 HOSPITAL OBSERVATION PER HR: HCPCS

## 2021-02-05 NOTE — EMERGENCY DEPARTMENT REPORT
HPI





- General


Chief Complaint: Chest Pain


Time Seen by Provider: 21 22:35





- HPI


HPI: 





Room 44





The patient is a 45-year-old male present with a chief complaint of chest pain. 

The patient states for 1 week he has had intermittent substernal chest pain 

described as throbbing and pressure-like in nature.  Patient states the pain 

radiates to his neck and he has noticed numbness in his left upper extremity.  

Patient admits to shortness of breath associated with his chest pain but denies 

nausea/vomiting or diaphoresis.  The patient currently gives his chest pain a 

score of 8/10.  The patient states his last cardiac catheterization occurred 

around  





ED Past Medical Hx





- Past Medical History


Previous Medical History?: Yes


Hx Hypertension: Yes


Hx CVA: Yes


Hx Diabetes: Yes


Hx GERD: Yes


Hx Headaches / Migraines: Yes


Additional medical history: sleep apnea, hiatal hernia.  chronic back pain, 

"heart problems"





- Surgical History


Past Surgical History?: Yes


Additional Surgical History: Right upper extremity surgery secondary to trauma, 

cardiac catheterization





- Family History


Family history: no significant





- Social History


Smoking Status: Never Smoker


Substance Use Type: None (Denies illicit drug use)





- Medications


Home Medications: 


                                Home Medications











 Medication  Instructions  Recorded  Confirmed  Last Taken  Type


 


cloNIDine [Catapres] 0.2 mg PO BID 18 Unknown History


 


AtorvaSTATin [Lipitor] 20 mg PO DAILY 08/15/19 01/06/20 Unknown History


 


Cyclobenzaprine [Flexeril 10 MG 10 mg PO TID PRN #20 tablet 08/15/19 01/06/20 

Unknown Rx





TAB]     


 


Ferrous Sulfate [Iron 325 MG] 325 mg PO BID 08/15/19 01/06/20 Unknown History


 


Glimepiride [Amaryl] 4 mg PO DAILY 08/15/19 01/06/20 Unknown History


 


Metformin HCl [metFORMIN] 1,000 mg PO BID 08/15/19 01/06/20 Unknown History


 


Triamterene-Hctz 75-50 mg Tab 1 tab PO DAILY 08/15/19 01/06/20 Unknown History


 


amLODIPine 10 mg PO DAILY 08/15/19 01/06/20 Unknown History


 


Aspirin EC [Halfprin EC] 81 mg PO QDAY #90 tablet. 10/19/19 01/06/20 Unknown 

Rx


 


methylPREDNISolone [Medrol] 4 mg PO BID #10 tablet 20  Unknown Rx


 


Pregabalin [Lyrica] 100 mg PO BID #60 capsule 20  Unknown Rx


 


Butalb/Acetamin/Caff -40 1 tab PO Q6HR PRN #10 tab 20  Unknown Rx





[Fioricet]     














ED Review of Systems


ROS: 


Stated complaint: CHEST PAINS


Other details as noted in HPI





Constitutional: denies: diaphoresis


Eyes: denies: eye pain


ENT: denies: throat pain


Respiratory: shortness of breath


Cardiovascular: chest pain


Endocrine: no symptoms reported


Gastrointestinal: denies: nausea, vomiting


Genitourinary: denies: dysuria


Musculoskeletal: denies: back pain


Neurological: paresthesias





Physical Exam





- Physical Exam


Vital Signs: 


                                   Vital Signs











  21





  20:41


 


Temperature 98.0 F


 


Pulse Rate 80


 


Respiratory 18





Rate 


 


Blood Pressure 133/94


 


O2 Sat by Pulse 97





Oximetry 











Physical Exam: 





GENERAL: The patient is well-developed well-nourished male lying on stretcher 

not appearing to be in acute distress. []


HEENT: Normocephalic.  Atraumatic.  Extraocular motions are intact.  Patient has

 moist mucous membranes.


NECK: Supple.  Trachea midline


CHEST/LUNGS: Clear to auscultation.  There is no respiratory distress noted.


HEART/CARDIOVASCULAR: Regular.  There is no tachycardia.  There is no gallop rub

 or murmur.


ABDOMEN: Abdomen is soft, nontender.  Patient has normal bowel sounds.  There is

 no abdominal distention.


SKIN: There is no rash.  There is no edema.  There is no diaphoresis.


NEURO: The patient is awake, alert, and oriented.  The patient is cooperative. 

The patient has normal speech 


MUSCULOSKELETAL:  There is no evidence of acute injury.





ED Course


                                   Vital Signs











  21





  20:41


 


Temperature 98.0 F


 


Pulse Rate 80


 


Respiratory 18





Rate 


 


Blood Pressure 133/94


 


O2 Sat by Pulse 97





Oximetry 














ED Medical Decision Making





- Lab Data


Result diagrams: 


                                 21 21:00





                                 21 21:00





- Radiology Data


Radiology results: report reviewed (Chest x-ray), image reviewed (Chest x-ray)


interpreted by me: 





Chest x-ray-no focal infiltrates, no pneumothorax.  No foreign body seen





Findings


[facility] [address] [place] [address] 

XRay Report Signed Patient: KEVIN MENDEZ MR#: [id number] : 1975 

Acct:[id number] Age/Sex: 45 / M ADM Date: 21 Loc: ED Attending Dr: 

Ordering Physician: ALPA WEINSTEIN MD Date of Service: 21 Procedure(s): XR chest 

1V ap Accession Number(s): [id number] cc: ED MD JS Fluoro Time In Minutes: CHEST 

1 VIEW INDICATION / CLINICAL INFORMATION: Chest Pain. COMPARISON: 2020 

FINDINGS: SUPPORT DEVICES: None. HEART / MEDIASTINUM: No significant 

abnormality. LUNGS / PLEURA: No significant pulmonary or pleural abnormality. No

 pneumothorax. ADDITIONAL FINDINGS: No significant additional findings. 

IMPRESSION: 1. No acute findings. No interval change. Signer Name: Ayla Enamorado MD Signed: 2021 9:48 PM Workstation Name: [id number] 

Transcribed By:  Dictated By: Ayla Enamorado MD Electronically 

Authenticated By: Ayla Enamorado MD Signed Date/Time: 21 DD/DT: 

21 TD/TT: 











- Differential Diagnosis


ACS, angina, pericarditis, GERD, anxiety


Critical care attestation.: 


If time is entered above; I have spent that time in minutes in the direct care 

of this critically ill patient, excluding procedure time.








ED Disposition


Clinical Impression: 


 Chest pain





Disposition:  OP ADMIT IP TO THIS HOSP


Is pt being admited?: Yes


Does the pt Need Aspirin: Yes


Condition: Fair


Instructions:  Chest Pain (ED)


Referrals: 


[facility] [Primary Care Provider] - 3-5 Days


Time of Disposition: 23:23 (Hospitalist paged (Dr Tena))





HEART Score





- HEART Score


History: Moderately suspicious


EKG: Normal


Age: 45-65


Risk factors: > 3 risk factors or hx of atherosclerotic disease


Troponin: 


                                        











Troponin T  < 0.010 ng/mL (0.00-0.029)   21  21:00    











Troponin: < normal limit


HEART Score: 4

## 2021-02-05 NOTE — XRAY REPORT
CHEST 1 VIEW 



INDICATION / CLINICAL INFORMATION:

Chest Pain.



COMPARISON: 

9/2/2020



FINDINGS:



SUPPORT DEVICES: None.



HEART / MEDIASTINUM: No significant abnormality. 



LUNGS / PLEURA: No significant pulmonary or pleural abnormality. No pneumothorax. 



ADDITIONAL FINDINGS: No significant additional findings.



IMPRESSION:

1. No acute findings. No interval change.



Signer Name: Ayla Enamorado MD 

Signed: 2/5/2021 9:48 PM

Workstation Name: [id number]

## 2021-02-06 LAB
BASOPHILS # (AUTO): 0 K/MM3 (ref 0–0.1)
BASOPHILS NFR BLD AUTO: 0.6 % (ref 0–1.8)
BUN SERPL-MCNC: 24 MG/DL (ref 9–20)
BUN/CREAT SERPL: 20 %
CALCIUM SERPL-MCNC: 10.1 MG/DL (ref 8.4–10.2)
EOSINOPHIL # BLD AUTO: 0.1 K/MM3 (ref 0–0.4)
EOSINOPHIL NFR BLD AUTO: 2.4 % (ref 0–4.3)
HCT VFR BLD CALC: 36.6 % (ref 35.5–45.6)
HDLC SERPL-MCNC: 34 MG/DL (ref 40–59)
HEMOLYSIS INDEX: 22
HGB BLD-MCNC: 11.7 GM/DL (ref 11.8–15.2)
LYMPHOCYTES # BLD AUTO: 1.9 K/MM3 (ref 1.2–5.4)
LYMPHOCYTES NFR BLD AUTO: 32 % (ref 13.4–35)
MCHC RBC AUTO-ENTMCNC: 32 % (ref 32–34)
MCV RBC AUTO: 79 FL (ref 84–94)
MONOCYTES # (AUTO): 0.5 K/MM3 (ref 0–0.8)
MONOCYTES % (AUTO): 9.1 % (ref 0–7.3)
PLATELET # BLD: 271 K/MM3 (ref 140–440)
RBC # BLD AUTO: 4.66 M/MM3 (ref 3.65–5.03)

## 2021-02-06 RX ADMIN — INSULIN LISPRO SCH UNIT: 100 INJECTION, SOLUTION INTRAVENOUS; SUBCUTANEOUS at 21:35

## 2021-02-06 RX ADMIN — ENOXAPARIN SODIUM SCH MG: 100 INJECTION SUBCUTANEOUS at 21:36

## 2021-02-06 RX ADMIN — TRIAMTERENE AND HYDROCHLOROTHIAZIDE SCH EACH: 37.5; 25 TABLET ORAL at 10:44

## 2021-02-06 RX ADMIN — INSULIN LISPRO SCH: 100 INJECTION, SOLUTION INTRAVENOUS; SUBCUTANEOUS at 09:41

## 2021-02-06 RX ADMIN — Medication SCH ML: at 10:45

## 2021-02-06 RX ADMIN — PREGABALIN SCH MG: 50 CAPSULE ORAL at 10:43

## 2021-02-06 RX ADMIN — PREGABALIN SCH MG: 50 CAPSULE ORAL at 21:35

## 2021-02-06 RX ADMIN — INSULIN LISPRO SCH UNIT: 100 INJECTION, SOLUTION INTRAVENOUS; SUBCUTANEOUS at 12:11

## 2021-02-06 RX ADMIN — FERROUS SULFATE TAB 325 MG (65 MG ELEMENTAL FE) SCH MG: 325 (65 FE) TAB at 21:35

## 2021-02-06 RX ADMIN — INSULIN LISPRO SCH UNIT: 100 INJECTION, SOLUTION INTRAVENOUS; SUBCUTANEOUS at 08:00

## 2021-02-06 RX ADMIN — MORPHINE SULFATE PRN MG: 2 INJECTION, SOLUTION INTRAMUSCULAR; INTRAVENOUS at 21:35

## 2021-02-06 RX ADMIN — MORPHINE SULFATE PRN MG: 2 INJECTION, SOLUTION INTRAMUSCULAR; INTRAVENOUS at 14:59

## 2021-02-06 RX ADMIN — Medication SCH ML: at 21:36

## 2021-02-06 RX ADMIN — INSULIN LISPRO SCH UNIT: 100 INJECTION, SOLUTION INTRAVENOUS; SUBCUTANEOUS at 16:56

## 2021-02-06 NOTE — PROGRESS NOTE
Assessment and Plan





Atypical chest pain, rule out ACS


Diabetes mellitus type 2, uncontrolled A1c 12.6


Morbid obesity


Hypertension


Chronic back pain





--Admitted to telemetry bed


- monitor with serial CE and EKG


-Negative UDS study


-Continue on Aspirin, statin, consult cardiology


- as needed SL NTG and iv morphin for pain


- Monitor BP, add betablocker and ACEI if BP tolerates


- ordered 2D echo and stress test on Monday per cardiologist


- cardiac diet now, NPO after midnight on Sunday


- provide DVT Px with lovenox











Subjective


Date of service: 02/06/21


Interval history: 





Patient seen and examined.  Medical records and medication list reviewed.  


No acute event overnight noted by the RN.  


Patient denies any difficulty breathing.  Patient is tolerating diet.  


Continue to complains of intermittent chest pain


Discussed plan of care at bedside with patient.








Objective





- Exam


Narrative Exam: 





GENERAL:  well-developed and morbidly obese -American male lying on bed 

appeared to be in no discomfort. 


HEENT: Normocephalic.  Atraumatic.  No conjunctival congestion or icterus. 

Patient has moist mucous membranes.


NECK: Supple.  Trachea midline.


CHEST/LUNGS: Clear to auscultated bilaterally, breathing nonlabored. No wheezes 

crackles or rhonchi.


HEART/CARDIOVASCULAR: Regular in rate and rhythm.  S1 and S2 positive.


ABDOMEN: Abdomen is soft, nontender.  Patient has normal bowel sounds.  


SKIN: There is no rash.  Warm and dry.


NEURO:  No focal motor deficit.  Follows command.


MUSCULOSKELETAL: No joint effusion or tenderness.


EXTRIMITY: No edema, no cyanosis or clubbing.


PSYCH:  Cooperative.





- Constitutional


Vitals: 


                               Vital Signs - 12hr











  02/06/21 02/06/21 02/06/21





  03:45 08:00 08:24


 


Temperature 96.7 F L  


 


Pulse Rate 66 97 H 83


 


Pulse Rate [   





Left Radial]   


 


Pulse Rate [   





Right Radial]   


 


Respiratory 18  





Rate   


 


Blood Pressure 130/88  133/79


 


O2 Sat by Pulse 95  





Oximetry   














  02/06/21 02/06/21 02/06/21





  08:53 09:00 10:00


 


Temperature 97.6 F  


 


Pulse Rate 67 73 


 


Pulse Rate [   66





Left Radial]   


 


Pulse Rate [   66





Right Radial]   


 


Respiratory 18  19





Rate   


 


Blood Pressure 124/62 122/72 


 


O2 Sat by Pulse 97 96 





Oximetry   














  02/06/21





  10:44


 


Temperature 


 


Pulse Rate 66


 


Pulse Rate [ 





Left Radial] 


 


Pulse Rate [ 





Right Radial] 


 


Respiratory 





Rate 


 


Blood Pressure 123/78


 


O2 Sat by Pulse 





Oximetry 














- Labs


CBC & Chem 7: 


                                 02/07/21 06:38





                                 02/07/21 06:38


Labs: 


                              Abnormal lab results











  02/05/21 02/05/21 02/06/21 Range/Units





  21:00 21:00 03:03 


 


Hgb     (11.8-15.2)  gm/dl


 


MCV  79 L    (84-94)  fl


 


MCH  26 L    (28-32)  pg


 


RDW  15.4 H    (13.2-15.2)  %


 


Mono % (Auto)  7.8 H    (0.0-7.3)  %


 


BUN   23 H   (9-20)  mg/dL


 


Glucose   232 H   ()  mg/dL


 


POC Glucose     ()  mg/dL


 


Hemoglobin A1c    12.3 H  (4-6)  %


 


Triglycerides     (2-149)  mg/dL


 


HDL Cholesterol     (40-59)  mg/dL














  02/06/21 02/06/21 02/06/21 Range/Units





  03:03 03:03 03:03 


 


Hgb   11.7 L   (11.8-15.2)  gm/dl


 


MCV   79 L   (84-94)  fl


 


MCH   25 L   (28-32)  pg


 


RDW   15.8 H   (13.2-15.2)  %


 


Mono % (Auto)   9.1 H   (0.0-7.3)  %


 


BUN    24 H  (9-20)  mg/dL


 


Glucose    215 H  ()  mg/dL


 


POC Glucose     ()  mg/dL


 


Hemoglobin A1c     (4-6)  %


 


Triglycerides  289 H    (2-149)  mg/dL


 


HDL Cholesterol  34 L    (40-59)  mg/dL














  02/06/21 Range/Units





  08:15 


 


Hgb   (11.8-15.2)  gm/dl


 


MCV   (84-94)  fl


 


MCH   (28-32)  pg


 


RDW   (13.2-15.2)  %


 


Mono % (Auto)   (0.0-7.3)  %


 


BUN   (9-20)  mg/dL


 


Glucose   ()  mg/dL


 


POC Glucose  164 H  ()  mg/dL


 


Hemoglobin A1c   (4-6)  %


 


Triglycerides   (2-149)  mg/dL


 


HDL Cholesterol   (40-59)  mg/dL














HEART Score





- HEART Score


History: Moderately suspicious


EKG: Normal


Age: 45-65


Risk factors: > 3 risk factors or hx of atherosclerotic disease


Troponin: 


                                        











Troponin T  < 0.010 ng/mL (0.00-0.029)   02/06/21  05:28    











Troponin: < normal limit


HEART Score: 4

## 2021-02-06 NOTE — DISCHARGE SUMMARY
Providers





- Providers


Date of Admission: 


02/05/21 23:34





Date of discharge: 02/06/21


Attending physician: 


CHELITA RIVERA





                                        





02/05/21 23:58


Consult to Dietitian/Nutrition [CONS] Routine 


   Physician Instructions: 


   Reason For Exam: 


   Reason for Consult: Diet education











Primary care physician: 


[facility] MD [facility]








Hospitalization


Condition: Fair


Disposition: DC-01 TO HOME OR SELFCARE


Time spent for discharge: 34 minutes





Core Measure Documentation





- Palliative Care


Palliative Care/ Comfort Measures: Not Applicable





- Core Measures


Any of the following diagnoses?: none





Exam





- Constitutional


Vitals: 


                                        











Temp Pulse Resp BP Pulse Ox


 


 97.6 F   66   19   123/78   96 


 


 02/06/21 08:53  02/06/21 10:44  02/06/21 10:00  02/06/21 10:44  02/06/21 09:00














Plan


Activity: advance as tolerated


Weight Bearing Status: Weight Bear as Tolerated


Diet: diabetic


Special Instructions: record blood sugar diary


Additional Instructions: Need better glycemic control


Follow up with: 


[facility] [Primary Care Provider] - 3-5 Days


CLAY DURAN MD [Staff Physician] - 7 Days

## 2021-02-06 NOTE — CONSULTATION
History of Present Illness


Consult date: 02/06/21


Requesting physician: CHELITA RIVERA


Consult reason: chest pain


History of present illness: 


Pt is a 45-year-old AA male, previously unknown to our practice, who presented 

with complaints of intermittent substernal chest pressure for approximately 1 

week prior to arrival. Pain radiates to neck and left arm. Pt also reports left 

arm numbness. Episodes last anywhere from a few min to a few hours. No 

aggravating or relieving factors. Associated with SOB. Pt denies any additional 

complaints. Trop neg x 3. ECG reveals no acute ischemic changes. CXR reveals no 

acute findings. No previous cardiac workup available for review. 





HEART Score: 4





Past History


Past Medical History: diabetes, GERD, hypertension, hyperlipidemia, migraines, 

stroke, other (GREGORY)


Social history: other (h/o polysubstance abuse)





Medications and Allergies


                                    Allergies











Allergy/AdvReac Type Severity Reaction Status Date / Time


 


tomato [Tomato] Allergy  Swelling Verified 08/15/19 14:38











                                Home Medications











 Medication  Instructions  Recorded  Confirmed  Last Taken  Type


 


cloNIDine [Catapres] 0.2 mg PO BID 05/19/18 02/06/21 02/05/21 History


 


AtorvaSTATin [Lipitor] 20 mg PO DAILY 08/15/19 02/06/21 02/05/21 History


 


Cyclobenzaprine [Flexeril 10 MG 10 mg PO TID PRN #20 tablet 08/15/19 02/06/21 

Unknown Rx





TAB]     


 


Ferrous Sulfate [Iron 325 MG] 325 mg PO BID 08/15/19 02/06/21 Unknown History


 


Glimepiride [Amaryl] 4 mg PO DAILY 08/15/19 02/06/21 Unknown History


 


Metformin HCl [metFORMIN] 1,000 mg PO BID 08/15/19 02/06/21 02/05/21 History


 


Triamterene-Hctz 75-50 mg Tab 1 tab PO DAILY 08/15/19 02/06/21 02/05/21 History


 


amLODIPine 10 mg PO DAILY 08/15/19 02/06/21 02/05/21 History


 


Aspirin EC [Halfprin EC] 81 mg PO QDAY #90 tablet. 10/19/19 02/06/21 Unknown 

Rx


 


Butalb/Acetamin/Caff -40 1 tab PO Q6HR PRN #10 tab 09/03/20 02/06/21 

Unknown Rx





[Fioricet -40]     











Active Meds: 


Active Medications





Acetaminophen (Acetaminophen 325 Mg Tab)  650 mg PO Q4H PRN


   PRN Reason: Pain MILD(1-3)/Fever >100.5/HA


Amlodipine Besylate (Amlodipine 10 Mg Tab)  10 mg PO DAILY [facility]


   Last Admin: 02/06/21 10:44 Dose:  10 mg


   Documented by: 


Aspirin (Aspirin Ec 81 Mg Tab)  81 mg PO QDAY [facility]


Atorvastatin Calcium (Atorvastatin 20 Mg Tab)  20 mg PO DAILY [facility]


   Last Admin: 02/06/21 10:43 Dose:  20 mg


   Documented by: 


Dextrose (Dextrose 50% In Water (25gm) 50 Ml Syringe)  0 ml IV Q30MIN PRN; 

Protocol


   PRN Reason: Hypoglycemia


Enoxaparin Sodium (Enoxaparin 40 Mg/0.4 Ml Inj)  40 mg SUB-Q QDAY@2200 [facility]; 

Protocol


Ferrous Sulfate (Ferrous Sulfate 325 Mg Tab)  325 mg PO BID [facility]


Glimepiride (Glimepiride 4 Mg Tab)  4 mg PO DAILY [facility]


Insulin Human Lispro (Insulin Lispro 100 Unit/Ml)  0 unit SUB-Q ACHS [facility]; 

Protocol


   Last Admin: 02/06/21 12:11 Dose:  4 unit


   Documented by: 


Magnesium Hydroxide (Magnesium Hydroxide (Mom) Oral Liqd Udc)  30 ml PO Q4H PRN


   PRN Reason: Constipation


Morphine Sulfate (Morphine 2 Mg/1 Ml Inj)  2 mg IV Q4H PRN


   PRN Reason: Pain , Severe (7-10)


   Last Admin: 02/06/21 14:59 Dose:  2 mg


   Documented by: 


Ondansetron HCl (Ondansetron 4 Mg/2 Ml Inj)  4 mg IV Q8H PRN


   PRN Reason: Nausea And Vomiting


Oxycodone/Acetaminophen (Oxycodone /Acetaminophen 5-325mg Tab)  1 tab PO Q6H PRN


   PRN Reason: Pain, Moderate (4-6)


Pregabalin (Pregabalin 50 Mg Cap)  100 mg PO BID [facility]


   Last Admin: 02/06/21 10:43 Dose:  100 mg


   Documented by: 


Sodium Chloride (Sodium Chloride 0.9% 10 Ml Flush Syringe)  10 ml IV BID [facility]


   Last Admin: 02/06/21 10:45 Dose:  10 ml


   Documented by: 


Sodium Chloride (Sodium Chloride 0.9% 10 Ml Flush Syringe)  10 ml IV PRN PRN


   PRN Reason: LINE FLUSH


Triamterene/Hydrochlorothiazide (Triamter/Hctz 37.5-25 Mg Tab)  2 each PO DAILY 

[facility]


   Last Admin: 02/06/21 10:44 Dose:  2 each


   Documented by: 











Review of Systems


Constitutional: no fever, no chills, no sweats


Ears, nose, mouth and throat: no nasal congestion, no sore throat


Cardiovascular: chest pain, shortness of breath, no orthopnea, no palpitations, 

no edema, no syncope, no lightheadedness, no dyspnea on exertion, no 

claudication


Respiratory: shortness of breath, no cough, no dyspnea on exertion


Gastrointestinal: no abdominal pain, no nausea, no vomiting, no diarrhea, no 

constipation


Genitourinary Male: no dysuria, no flank pain


Musculoskeletal: no neck stiffness, no neck pain, no myalgias


Integumentary: no rash, no wounds


Neurological: numbness (LUE), no head injury, no paralysis, no weakness, no 

parathesias, no tingling, no seizures, no syncope, no vertigo, no headaches


Endocrine: no cold intolerance, no heat intolerance, no polydipsia, no polyuria


Hematologic/Lymphatic: no easy bruising, no easy bleeding


Allergic/Immunologic: no anaphylaxis





Physical Examination


Last Vital Signs











Temp  97.6 F   02/06/21 08:53


 


Pulse  89   02/06/21 12:00


 


Resp  19   02/06/21 10:00


 


BP  123/78   02/06/21 10:44


 


Pulse Ox  96   02/06/21 09:00








General appearance: no acute distress


HEENT: Positive: EOMI, Normocephaly, Mucus Membranes Moist


Neck: Positive: neck supple, trachea midline.  Negative: JVD/HJR


Cardiac: Positive: Reg Rate and Rhythm, S1/S2


Lungs: Positive: clear to auscultation


Neuro: Positive: Grossly Intact


Abdomen: Positive: Soft.  Negative: Tender


Skin: Negative: Rash


Musculoskeletal: Normal Range of Motion


Extremities: Present: upper extr. pulses, lower extr. pulses.  Absent: edema





Results





                                 02/07/21 06:38





                                 02/07/21 06:38


                                     Lipids











  02/06/21 Range/Units





  03:03 


 


Triglycerides  289 H  (2-149)  mg/dL


 


Cholesterol  140  ()  mg/dL


 


HDL Cholesterol  34 L  (40-59)  mg/dL


 


Cholesterol/HDL Ratio  4.11  %








                                       CBC











  02/05/21 02/06/21 Range/Units





  21:00 03:03 


 


WBC  6.6  5.9  (4.5-11.0)  K/mm3


 


RBC  4.71  4.66  (3.65-5.03)  M/mm3


 


Hgb  12.2  11.7 L  (11.8-15.2)  gm/dl


 


Hct  37.3  36.6  (35.5-45.6)  %


 


Plt Count  275  271  (140-440)  K/mm3


 


Lymph # (Auto)  1.8  1.9  (1.2-5.4)  K/mm3


 


Mono # (Auto)  0.5  0.5  (0.0-0.8)  K/mm3


 


Eos # (Auto)  0.1  0.1  (0.0-0.4)  K/mm3


 


Baso # (Auto)  0.1  0.0  (0.0-0.1)  K/mm3








                          Comprehensive Metabolic Panel











  02/05/21 02/06/21 Range/Units





  21:00 03:03 


 


Sodium  139  139  (137-145)  mmol/L


 


Potassium  4.6  4.8  (3.6-5.0)  mmol/L


 


Chloride  100.5  100.4  ()  mmol/L


 


Carbon Dioxide  23  24  (22-30)  mmol/L


 


BUN  23 H  24 H  (9-20)  mg/dL


 


Creatinine  1.3  1.2  (0.8-1.3)  mg/dL


 


Glucose  232 H  215 H  ()  mg/dL


 


Calcium  10.2  10.1  (8.4-10.2)  mg/dL














- Imaging and Cardiology


Echo: pending


EKG: report reviewed, image reviewed





- EKG Interpretation


EKG: no acute changes





EKG interpretations





- EKG


Sinus rhythms and dysrhythmias: sinus rhythm


Myocardial infarction: septal MI (old age or ind, anterior MI (old age or i





Assessment and Plan


AMI r/o. 


Echo pending. 


Plan for Lexiscan stress MPI on Monday AM. NPO after midnight Sunday. 


Continue ASA & statin. 


Continue current antihypertensive regimen. 





Case reviewed in conjunction with Dr. Tello, who agrees with the assessment 

and plan of care.





- Patient Problems


(1) Chest pain


Current Visit: Yes   Status: Acute   





(2) GREGORY (obstructive sleep apnea)


Current Visit: Yes   Status: Chronic   





(3) Hypertension


Current Visit: Yes   Status: Chronic   


Qualifiers: 


   Hypertension type: essential hypertension   Qualified Code(s): I10 - 

Essential (primary) hypertension   





(4) HLD (hyperlipidemia)


Current Visit: Yes   Status: Chronic   


Qualifiers: 


   Hyperlipidemia type: mixed hyperlipidemia   Qualified Code(s): E78.2 - Mixed 

hyperlipidemia   





(5) DM2 (diabetes mellitus, type 2)


Current Visit: Yes   Status: Chronic   





(6) GERD (gastroesophageal reflux disease)


Current Visit: Yes   Status: Chronic   





(7) Chronic back pain


Current Visit: Yes   Status: Chronic   





(8) H/O: CVA (cerebrovascular accident)


Current Visit: Yes   Status: Chronic

## 2021-02-06 NOTE — HISTORY AND PHYSICAL REPORT
History of Present Illness


Date of examination: 02/05/21


Date of admission: 


02/05/2021


Chief complaint: 





Chest Pain


History of present illness: 





45 year old male with history of CVA, D/Mellitus, hypertension seen in the 

emergency room complaining of Chest Pain. Pain felt like pressure and was 

throbbing at times. This has been ongoing for about a week.  No no relieving or 

exacerbating factor. On a scale of 10 pain was about  8/10 in severity





Pain radiates to neck. He has also had some numbness in the left upper 

extremity. He has had associated shortness, no nausea vomiting, no abdominal 

pain, no headache or dizziness.  Patient denies any diaphoresis, no fever or 

chills.  


Patient states that he had a cardiac catheterization sometime in 2012.  Did not 

have any stent placement.





Work-up in the emergency room today including chest x-ray, EKG and troponins 

were unremarkable.





Patient is being admitted for chest pain evaluation.





Past History


Past Medical History: diabetes, GERD, hypertension, hyperlipidemia, stroke, ot

her (Sleep apnea, hiatal hernia, chronic back pain, migraine headaches)


Past Surgical History: Other (Cardiac cath about 8 to 9 years ago)


Social history: no significant social history


Family history: no significant family history





Medications and Allergies


                                    Allergies











Allergy/AdvReac Type Severity Reaction Status Date / Time


 


tomato [Tomato] Allergy  Swelling Verified 08/15/19 14:38











                                Home Medications











 Medication  Instructions  Recorded  Confirmed  Last Taken  Type


 


cloNIDine [Catapres] 0.2 mg PO BID 05/19/18 01/06/20 Unknown History


 


AtorvaSTATin [Lipitor] 20 mg PO DAILY 08/15/19 01/06/20 Unknown History


 


Cyclobenzaprine [Flexeril 10 MG 10 mg PO TID PRN #20 tablet 08/15/19 01/06/20 

Unknown Rx





TAB]     


 


Ferrous Sulfate [Iron 325 MG] 325 mg PO BID 08/15/19 01/06/20 Unknown History


 


Glimepiride [Amaryl] 4 mg PO DAILY 08/15/19 01/06/20 Unknown History


 


Metformin HCl [metFORMIN] 1,000 mg PO BID 08/15/19 01/06/20 Unknown History


 


Triamterene-Hctz 75-50 mg Tab 1 tab PO DAILY 08/15/19 01/06/20 Unknown History


 


amLODIPine 10 mg PO DAILY 08/15/19 01/06/20 Unknown History


 


Aspirin EC [Halfprin EC] 81 mg PO QDAY #90 tablet.dr 10/19/19 01/06/20 Unknown 

Rx


 


methylPREDNISolone [Medrol] 4 mg PO BID #10 tablet 01/07/20  Unknown Rx


 


Pregabalin [Lyrica] 100 mg PO BID #60 capsule 01/08/20  Unknown Rx


 


Butalb/Acetamin/Caff -40 1 tab PO Q6HR PRN #10 tab 09/03/20  Unknown Rx





[Fioricet]     











Active Meds: 


Active Medications





Acetaminophen (Acetaminophen 325 Mg Tab)  650 mg PO Q4H PRN


   PRN Reason: Pain MILD(1-3)/Fever >100.5/HA


Acetaminophen (Acetaminophen 325 Mg Tab)  650 mg PO Q6H PRN


   PRN Reason: Pain, Mild (1-3)


Aspirin (Aspirin Ec 325 Mg Tab)  325 mg PO QDAY ANNELISE


Dextrose (Dextrose 50% In Water (25gm) 50 Ml Syringe)  50 ml IV Q30MIN PRN; 

Protocol


   PRN Reason: Hypoglycemia


Insulin Human Lispro (Insulin Lispro 100 Unit/Ml)  0 unit SUB-Q ACHS ANNELISE; 

Protocol


Magnesium Hydroxide (Magnesium Hydroxide (Mom) Oral Liqd Udc)  30 ml PO Q4H PRN


   PRN Reason: Constipation


Morphine Sulfate (Morphine 4 Mg/1 Ml Inj)  2 mg IV Q5MIN PRN


   PRN Reason: Chest Pain


Nitroglycerin (Nitroglycerin 0.4 Mg Tab Subl)  0.4 mg SL Q5M PRN


   PRN Reason: Chest Pain


Ondansetron HCl (Ondansetron 4 Mg/2 Ml Inj)  4 mg IV Q8H PRN


   PRN Reason: Nausea And Vomiting


Sodium Chloride (Sodium Chloride 0.9% 10 Ml Flush Syringe)  10 ml IV BID ANNELISE


Sodium Chloride (Sodium Chloride 0.9% 10 Ml Flush Syringe)  10 ml IV PRN PRN


   PRN Reason: LINE FLUSH


Sodium Chloride (Sodium Chloride 0.9% 10 Ml Flush Syringe)  10 ml IV PRN PRN


   PRN Reason: LINE FLUSH











Review of Systems


Constitutional: no fever, no chills, no weakness, no malaise, no lethargy


Ears, nose, mouth and throat: no nasal congestion, no sore throat


Cardiovascular: chest pain, no orthopnea, no palpitations


Respiratory: no cough, no shortness of breath


Gastrointestinal: no abdominal pain, no nausea, no vomiting, no diarrhea


Genitourinary Male: no dysuria, no hematuria, no flank pain, no nocturia


Musculoskeletal: no neck pain, no low back pain


Integumentary: no rash, no pruritis


Neurological: no headaches, no confusion


Psychiatric: no anxiety, no depression





Exam





- Constitutional


Vitals: 


                                        











Temp Pulse Resp BP Pulse Ox


 


 98.0 F   75   13   145/105   99 


 


 02/05/21 20:41  02/05/21 23:03  02/05/21 23:01  02/05/21 23:03  02/05/21 22:39











General appearance: Present: no acute distress, well-nourished, obese





- EENT


Eyes: Present: PERRL, EOM intact.  Absent: scleral icterus


ENT: hearing intact, clear oral mucosa, dentition normal





- Neck


Neck: Present: supple, normal ROM





- Respiratory


Respiratory effort: normal


Respiratory: bilateral: CTA





- Cardiovascular


Rhythm: regular


Heart Sounds: Present: S1 & S2.  Absent: gallop, systolic murmur, diastolic 

murmur, rub, click





- Extremities


Extremities: no ischemia, pulses intact, pulses symmetrical, No edema, normal 

temperature, normal color, Full ROM


Peripheral Pulses: within normal limits





- Abdominal


General gastrointestinal: Present: soft, non-tender, non-distended, normal bowel

 sounds.  Absent: mass





- Integumentary


Integumentary: Present: clear, warm, dry.  Absent: rash





- Musculoskeletal


Musculoskeletal: strength equal bilaterally





- Psychiatric


Psychiatric: appropriate mood/affect, intact judgment & insight, memory intact, 

cooperative





- Neurologic


Neurologic: CNII-XII intact, no focal deficits, moves all extremities





HEART Score





- HEART Score


History: Moderately suspicious


EKG: Normal


Age: 45-65


Risk factors: > 3 risk factors or hx of atherosclerotic disease


Troponin: 


                                        











Troponin T  < 0.010 ng/mL (0.00-0.029)   02/05/21  21:00    











Troponin: < normal limit


HEART Score: 4





Results





- Labs


CBC & Chem 7: 


                                 02/05/21 21:00





                                 02/05/21 21:00


Labs: 


                              Abnormal lab results











  02/05/21 02/05/21 Range/Units





  21:00 21:00 


 


MCV  79 L   (84-94)  fl


 


MCH  26 L   (28-32)  pg


 


RDW  15.4 H   (13.2-15.2)  %


 


Mono % (Auto)  7.8 H   (0.0-7.3)  %


 


BUN   23 H  (9-20)  mg/dL


 


Glucose   232 H  ()  mg/dL














Assessment and Plan





- Patient Problems


(1) Chest pain


Current Visit: Yes   Status: Acute   


Plan to address problem: 


And admitted and placed on telemetry.  Will check serial cardiac enzymes.  

Patient started on aspirin, sublingual nitroglycerin and IV morphine as needed 

for chest pain.


Consult placed to cardiology for evaluation and recommendation.








(2) Chronic back pain


Current Visit: No   Status: Acute   


Plan to address problem: 


We will continue patient on his routine on medication.








(3) Hypertension


Current Visit: No   Status: Acute   


Plan to address problem: 


We will resume routine home medications and monitor vital signs closely.








(4) Diabetes mellitus


Current Visit: Yes   Status: Acute   


Plan to address problem: 


We will monitor Accu-Cheks closely.








(5) Morbid obesity with BMI of 40.0-44.9, adult


Current Visit: No   Status: Chronic   


Plan to address problem: 


Patient encouraged on lifestyle modification.


Dietary consult placed.








(6) DVT prophylaxis


Current Visit: No   Status: Acute   


Plan to address problem: 


Patient placed on subcutaneous Lovenox.








(7) Full code status


Current Visit: Yes   Status: Acute   


Plan to address problem: 


Patient is a full code.

## 2021-02-07 LAB
BASOPHILS # (AUTO): 0 K/MM3 (ref 0–0.1)
BASOPHILS NFR BLD AUTO: 0.6 % (ref 0–1.8)
BUN SERPL-MCNC: 16 MG/DL (ref 9–20)
BUN/CREAT SERPL: 15 %
CALCIUM SERPL-MCNC: 9.5 MG/DL (ref 8.4–10.2)
EOSINOPHIL # BLD AUTO: 0.2 K/MM3 (ref 0–0.4)
EOSINOPHIL NFR BLD AUTO: 3.8 % (ref 0–4.3)
HCT VFR BLD CALC: 38.5 % (ref 35.5–45.6)
HEMOLYSIS INDEX: 10
HGB BLD-MCNC: 12.5 GM/DL (ref 11.8–15.2)
INR PPP: 0.96 (ref 0.87–1.13)
LYMPHOCYTES # BLD AUTO: 1.5 K/MM3 (ref 1.2–5.4)
LYMPHOCYTES NFR BLD AUTO: 32.1 % (ref 13.4–35)
MCHC RBC AUTO-ENTMCNC: 32 % (ref 32–34)
MCV RBC AUTO: 79 FL (ref 84–94)
MONOCYTES # (AUTO): 0.4 K/MM3 (ref 0–0.8)
MONOCYTES % (AUTO): 8.7 % (ref 0–7.3)
PLATELET # BLD: 268 K/MM3 (ref 140–440)
RBC # BLD AUTO: 4.88 M/MM3 (ref 3.65–5.03)

## 2021-02-07 RX ADMIN — Medication SCH ML: at 15:25

## 2021-02-07 RX ADMIN — INSULIN LISPRO SCH UNIT: 100 INJECTION, SOLUTION INTRAVENOUS; SUBCUTANEOUS at 09:25

## 2021-02-07 RX ADMIN — INSULIN LISPRO SCH UNIT: 100 INJECTION, SOLUTION INTRAVENOUS; SUBCUTANEOUS at 18:20

## 2021-02-07 RX ADMIN — TRIAMTERENE AND HYDROCHLOROTHIAZIDE SCH EACH: 37.5; 25 TABLET ORAL at 09:24

## 2021-02-07 RX ADMIN — FERROUS SULFATE TAB 325 MG (65 MG ELEMENTAL FE) SCH MG: 325 (65 FE) TAB at 21:18

## 2021-02-07 RX ADMIN — Medication SCH ML: at 21:18

## 2021-02-07 RX ADMIN — ASPIRIN SCH MG: 81 TABLET, COATED ORAL at 09:24

## 2021-02-07 RX ADMIN — PREGABALIN SCH MG: 50 CAPSULE ORAL at 09:25

## 2021-02-07 RX ADMIN — INSULIN LISPRO SCH: 100 INJECTION, SOLUTION INTRAVENOUS; SUBCUTANEOUS at 12:25

## 2021-02-07 RX ADMIN — INSULIN LISPRO SCH UNIT: 100 INJECTION, SOLUTION INTRAVENOUS; SUBCUTANEOUS at 21:25

## 2021-02-07 RX ADMIN — GLIMEPIRIDE SCH MG: 4 TABLET ORAL at 09:24

## 2021-02-07 RX ADMIN — PREGABALIN SCH MG: 50 CAPSULE ORAL at 21:18

## 2021-02-07 RX ADMIN — OXYCODONE AND ACETAMINOPHEN PRN TAB: 5; 325 TABLET ORAL at 21:19

## 2021-02-07 RX ADMIN — ENOXAPARIN SODIUM SCH MG: 100 INJECTION SUBCUTANEOUS at 21:18

## 2021-02-07 RX ADMIN — FERROUS SULFATE TAB 325 MG (65 MG ELEMENTAL FE) SCH MG: 325 (65 FE) TAB at 09:25

## 2021-02-07 NOTE — PROGRESS NOTE
Assessment and Plan





Atypical chest pain, rule out ACS


Diabetes mellitus type 2, uncontrolled A1c 12.6


Morbid obesity


Hypertension


Chronic back pain





--Admitted to telemetry bed


- monitor with serial CE and EKG


-Negative UDS study


-Continue on Aspirin, statin, consult cardiology


- as needed SL NTG and iv morphin for pain


- Monitor BP, add betablocker and ACEI if BP tolerates


- ordered 2D echo and stress test tomorrow per cardiologist


- cardiac/consistent carb diet now, NPO after midnight 


- provide DVT Px with lovenox











Subjective


Date of service: 02/07/21


Interval history: 





Patient seen and examined.  Medical records and medication list reviewed.  


No acute event overnight noted by the RN.  


Patient denies any difficulty breathing.  Patient is tolerating diet.  


Continue to complains of intermittent chest pain


Discussed plan of care at bedside with patient.


Planned for stress tomorrow











Objective





- Exam


Narrative Exam: 





GENERAL:  well-developed and morbidly obese -American male lying on bed 

appeared to be in no discomfort. 


HEENT: Normocephalic.  Atraumatic.  No conjunctival congestion or icterus. Irish

ent has moist mucous membranes.


NECK: Supple.  Trachea midline.


CHEST/LUNGS: Clear to auscultated bilaterally, breathing nonlabored. No wheezes 

crackles or rhonchi.


HEART/CARDIOVASCULAR: Regular in rate and rhythm.  S1 and S2 positive.


ABDOMEN: Abdomen is soft, nontender.  Patient has normal bowel sounds.  


SKIN: There is no rash.  Warm and dry.


NEURO:  No focal motor deficit.  Follows command.


MUSCULOSKELETAL: No joint effusion or tenderness.


EXTRIMITY: No edema, no cyanosis or clubbing.


PSYCH:  Cooperative.





- Constitutional


Vitals: 


                               Vital Signs - 12hr











  02/07/21 02/07/21 02/07/21





  00:00 00:13 04:43


 


Temperature  98.0 F 98.0 F


 


Pulse Rate 75 85 88


 


Respiratory  18 18





Rate   


 


Blood Pressure  128/84 131/98


 


O2 Sat by Pulse  99 98





Oximetry   














  02/07/21 02/07/21





  08:05 08:25


 


Temperature 98.2 F 


 


Pulse Rate 78 78


 


Respiratory 18 





Rate  


 


Blood Pressure 149/92 


 


O2 Sat by Pulse 99 





Oximetry  














- Labs


CBC & Chem 7: 


                                 02/07/21 06:38





                                 02/07/21 06:38


Labs: 


                              Abnormal lab results











  02/06/21 02/06/21 02/06/21 Range/Units





  11:33 16:50 20:17 


 


MCV     (84-94)  fl


 


MCH     (28-32)  pg


 


RDW     (13.2-15.2)  %


 


Mono % (Auto)     (0.0-7.3)  %


 


Sodium     (137-145)  mmol/L


 


Chloride     ()  mmol/L


 


Glucose     ()  mg/dL


 


POC Glucose  227 H  151 H  298 H  ()  mg/dL














  02/07/21 02/07/21 02/07/21 Range/Units





  06:38 06:38 07:26 


 


MCV  79 L    (84-94)  fl


 


MCH  26 L    (28-32)  pg


 


RDW  15.3 H    (13.2-15.2)  %


 


Mono % (Auto)  8.7 H    (0.0-7.3)  %


 


Sodium   134 L   (137-145)  mmol/L


 


Chloride   97.2 L   ()  mmol/L


 


Glucose   207 H   ()  mg/dL


 


POC Glucose    187 H  ()  mg/dL














HEART Score





- HEART Score


EKG: Normal


Age: 45-65


Risk factors: > 3 risk factors or hx of atherosclerotic disease


Troponin: 


                                        











Troponin T  < 0.010 ng/mL (0.00-0.029)   02/06/21  05:28    











Troponin: < normal limit

## 2021-02-07 NOTE — PROGRESS NOTE
Assessment and Plan


Echo findings noted - mild concentric LVH, EF 60-65%, impaired LV relaxation, 

RVSP 9mmHg. 


Plan for Lexiscan stress MPI in AM. NPO after midnight.


Continue ASA & statin. 


Continue current antihypertensive regimen. 


Recommend outpatient sleep eval upon discharge. 





Case reviewed in conjunction with Dr. Tello, who agrees with the assessment 

and plan of care.





- Patient Problems


(1) Chest pain


Current Visit: Yes   Status: Acute   





(2) GREGORY (obstructive sleep apnea)


Current Visit: Yes   Status: Suspected   





(3) Hypertension


Current Visit: Yes   Status: Chronic   


Qualifiers: 


   Hypertension type: essential hypertension   Qualified Code(s): I10 - 

Essential (primary) hypertension   





(4) HLD (hyperlipidemia)


Current Visit: Yes   Status: Chronic   


Qualifiers: 


   Hyperlipidemia type: mixed hyperlipidemia   Qualified Code(s): E78.2 - Mixed 

hyperlipidemia   





(5) DM2 (diabetes mellitus, type 2)


Current Visit: Yes   Status: Chronic   





(6) GERD (gastroesophageal reflux disease)


Current Visit: Yes   Status: Chronic   





(7) Chronic back pain


Current Visit: Yes   Status: Chronic   





(8) H/O: CVA (cerebrovascular accident)


Current Visit: Yes   Status: Chronic   





Subjective


Date of service: 02/07/21


Principal diagnosis: Chest Pain


Interval history: 


Resting comfortably in bed upon exam. Denies chest pain this AM. No additional 

complaints. Tele reviewed - SR 70-80s, ST up to 150s noted w/activity this AM, 

no events. 





Objective


Last Vital Signs











Temp  98.2 F   02/07/21 08:05


 


Pulse  78   02/07/21 08:25


 


Resp  18   02/07/21 08:05


 


BP  149/92   02/07/21 08:05


 


Pulse Ox  99   02/07/21 08:05











- Physical Examination


General: No Apparent Distress


HEENT: Positive: Normocephaly


Neck: Positive: neck supple, trachea midline


Cardiac: Positive: Reg Rate and Rhythm, S1/S2


Lungs: Positive: clear to auscultation


Neuro: Positive: Grossly Intact


Abdomen: Positive: Soft.  Negative: Tender


Skin: Negative: Rash


Musculoskeletal: Normal Range of Motion


Extremities: Absent: edema





- Labs and Meds


                                   Coagulation











  02/07/21 Range/Units





  06:38 


 


PT  12.7  (12.2-14.9)  Sec.


 


INR  0.96  (0.87-1.13)  








                                       CBC











  02/07/21 Range/Units





  06:38 


 


WBC  4.5  (4.5-11.0)  K/mm3


 


RBC  4.88  (3.65-5.03)  M/mm3


 


Hgb  12.5  (11.8-15.2)  gm/dl


 


Hct  38.5  (35.5-45.6)  %


 


Plt Count  268  (140-440)  K/mm3


 


Lymph # (Auto)  1.5  (1.2-5.4)  K/mm3


 


Mono # (Auto)  0.4  (0.0-0.8)  K/mm3


 


Eos # (Auto)  0.2  (0.0-0.4)  K/mm3


 


Baso # (Auto)  0.0  (0.0-0.1)  K/mm3








                          Comprehensive Metabolic Panel











  02/07/21 Range/Units





  06:38 


 


Sodium  134 L  (137-145)  mmol/L


 


Potassium  4.4  (3.6-5.0)  mmol/L


 


Chloride  97.2 L  ()  mmol/L


 


Carbon Dioxide  28  (22-30)  mmol/L


 


BUN  16  (9-20)  mg/dL


 


Creatinine  1.1  (0.8-1.3)  mg/dL


 


Glucose  207 H  ()  mg/dL


 


Calcium  9.5  (8.4-10.2)  mg/dL














- Imaging and Cardiology


EKG: report reviewed, image reviewed


Pharmacologic stress test: pending


Echo: report reviewed (2/6/2021 - mild concentric LVH, EF 60-65%, impaired LV 

relaxation, RVSP 9mmHg. )





- Telemetry


EKG Rhythm: Sinus Rhythm





- EKG


Sinus rhythms and dysrhythmias: sinus rhythm


Myocardial infarction: septal MI (old age or ind, anterior MI (old age or i

## 2021-02-08 VITALS — SYSTOLIC BLOOD PRESSURE: 116 MMHG | DIASTOLIC BLOOD PRESSURE: 74 MMHG

## 2021-02-08 RX ADMIN — INSULIN LISPRO SCH: 100 INJECTION, SOLUTION INTRAVENOUS; SUBCUTANEOUS at 08:44

## 2021-02-08 RX ADMIN — OXYCODONE AND ACETAMINOPHEN PRN TAB: 5; 325 TABLET ORAL at 07:28

## 2021-02-08 RX ADMIN — GLIMEPIRIDE SCH MG: 4 TABLET ORAL at 10:20

## 2021-02-08 RX ADMIN — PREGABALIN SCH MG: 50 CAPSULE ORAL at 10:20

## 2021-02-08 RX ADMIN — FERROUS SULFATE TAB 325 MG (65 MG ELEMENTAL FE) SCH MG: 325 (65 FE) TAB at 10:21

## 2021-02-08 RX ADMIN — Medication SCH: at 10:21

## 2021-02-08 RX ADMIN — ASPIRIN SCH MG: 81 TABLET, COATED ORAL at 10:21

## 2021-02-08 RX ADMIN — INSULIN LISPRO SCH UNIT: 100 INJECTION, SOLUTION INTRAVENOUS; SUBCUTANEOUS at 13:25

## 2021-02-08 RX ADMIN — TRIAMTERENE AND HYDROCHLOROTHIAZIDE SCH EACH: 37.5; 25 TABLET ORAL at 10:21

## 2021-02-08 NOTE — TREADMILL REPORT
IV PHARMACOLOGIC MYOCARDIAL PERFUSION IMAGING REPORT 



The patient is a 45-year-old white gentleman with history of hypertension,

diabetes mellitus, morbid obesity and sleep apnea, was admitted with atypical

chest pains of many days' duration.  

EKG showed sinus rhythm with no acute

changes.  The patient underwent pharmacological myocardial perfusion imaging

using IV regadenoson as the vasodilator and technetium 99m sestamibi was used as

the nuclear tracer. 



Resting nuclear perfusion images were obtained followed by vasodilation with IV

regadenoson 0.4 mg.  EKG showed sinus rhythm with no ST-T changes to suggest

ischemia, no arrhythmia noted.  The patient did not have any chest pain. 

Post-vasodilation images along with gating were obtained using technetium 99m

sestamibi. 



The patient tolerated the procedure well.  No untoward complications were noted.





Following findings were noted. 



1.  Did not have any chest pain to suggest angina. 

2.  No significant EKG changes to suggest ischemia with a baseline rhythm being

sinus with no arrhythmia noted. 

3.  Resting nuclear images showed mild inferior defect, most likely representing

attenuation by diaphragm.  No significant perfusion abnormalities noted except

for inferior defect, which was felt to be artifactual from diaphragmatic

attenuation.  Transient ischemic dilation ratio was found to be 0.99.  Gated

studies showed left ventricular ejection fraction post-vasodilation to be 58%

with end-diastolic volume of 124 mL and end-systolic volume of 52 mL. 



FINAL IMPRESSION: 

1.  The patient tolerated IV regadenoson well. 

2.  Negative for angina, negative for ischemia on the EKG, no arrhythmia noted. 

3.  Myocardial perfusion imaging did not show significant perfusion defects to

suggest ischemia.  Inferior wall abnormalities most consistent with

diaphragmatic attenuation were noted. 



Overall, this study was felt to be low risk for future cardiac events.





DD: 02/08/2021 11:53

DT: 02/08/2021 12:03

[facility]# [id number]  [id number]

SARITA/ARABELLA HOWARD

## 2021-02-08 NOTE — DISCHARGE SUMMARY
Providers





- Providers


Date of Admission: 


02/07/21 14:33





Date of discharge: 02/08/21


Attending physician: 


CHELITA RIVERA





                                        





02/05/21 23:58


Consult to Dietitian/Nutrition [CONS] Routine 


   Physician Instructions: 


   Reason For Exam: 


   Reason for Consult: Diet education





02/06/21 13:57


Consult to Physician [CONS] Routine 


   Comment: 


   Consulting Provider: VAN CEJA


   Physician Instructions: 


   Reason For Exam: chest pain











Primary care physician: 


[facility], [place]








Hospitalization


Reason for admission: chest pain


Condition: Fair


Hospital course: 


 45-year-old -American male with a history of hypertension, 

hyperlipidemia, diabetes mellitus type 2, history of CVA in the past with no 

residual deficit, morbid obesity presented to the ER with complaints of  

Retrosternal chest pain for about a week.  Patient was evaluated in the ER,  

initial cardiac enzyme and EKG was unremarkable, chest x-ray showed no 

infiltrates.  Patient was admitted for further evaluation and management, 

cardiology was consulted.


Echo findings noted - mild concentric LVH, EF 60-65%, impaired LV relaxation, 

RVSP 9mmHg. 


s/p Lexiscan MPI Stress Test today, which was negative.


Currently stable cardiac status. Chest pain resolved. AMI ruled out. Continue 

current cardiac regimen.


Recommend outpatient sleep eval upon discharge. 


Patient was cleared for discharge by cardiology.


Recommend f/u with [facility] within 1-2 weeks. 





Discharge diagnosis:


(1) Chest pain


Current Visit: Yes   Status: Resolved   


Likely due to GERD





(2) GREGORY (obstructive sleep apnea)


Current Visit: Yes   Status: Suspected   





(3) Hypertension


Current Visit: Yes   Status: Chronic   


Qualifiers: 


   Hypertension type: essential hypertension   Qualified Code(s): I10 - 

Essential (primary) hypertension   





(4) HLD (hyperlipidemia)


Current Visit: Yes   Status: Chronic   


Qualifiers: 


   Hyperlipidemia type: mixed hyperlipidemia   Qualified Code(s): E78.2 - Mixed 

hyperlipidemia   





(5) DM2 (diabetes mellitus, type 2)


Current Visit: Yes   Status: Chronic   


uncontrolled A1c 12.6





(6) GERD (gastroesophageal reflux disease)


Current Visit: Yes   Status: Chronic   





(7) Chronic back pain


Current Visit: Yes   Status: Chronic   





(8) H/O: CVA (cerebrovascular accident)


Current Visit: Yes   Status: Chronic  





(9) Morbid obesity


Current Visit: Yes   Status: Chronic   


Disposition: DC-01 TO HOME OR SELFCARE


Time spent for discharge: 34 minutes





Core Measure Documentation





- Palliative Care


Palliative Care/ Comfort Measures: Not Applicable





- Core Measures


Any of the following diagnoses?: none





Exam





- Physical Exam


Narrative exam: 





GENERAL:  well-developed and morbidly obese -American male lying on bed 

appeared to be in no discomfort. 


HEENT: Normocephalic.  Atraumatic.  No conjunctival congestion or icterus. 

Patient has moist mucous membranes.


NECK: Supple.  Trachea midline.


CHEST/LUNGS: Clear to auscultated bilaterally, breathing nonlabored. No wheezes 

crackles or rhonchi.


HEART/CARDIOVASCULAR: Regular in rate and rhythm.  S1 and S2 positive.


ABDOMEN: Abdomen is soft, nontender.  Patient has normal bowel sounds.  


SKIN: There is no rash.  Warm and dry.


NEURO:  No focal motor deficit.  Follows command.


MUSCULOSKELETAL: No joint effusion or tenderness.


EXTRIMITY: No edema, no cyanosis or clubbing.


PSYCH:  Cooperative.





- Constitutional


Vitals: 


                                        











Temp Pulse Resp BP Pulse Ox


 


 98.0 F   110 H  20   116/74   94 


 


 02/08/21 11:20  02/08/21 11:20  02/08/21 10:06  02/08/21 11:20  02/08/21 11:20














Plan


Activity: advance as tolerated


Weight Bearing Status: Weight Bear as Tolerated


Diet: low fat, low salt, diabetic


Special Instructions: record blood sugar diary


Additional Instructions: Follow-up with [place] [facility]ologist in 1 week.  

Recommend sleep study for possible sleep apnea as outpatient.  Recommend better 

glycemic control.  Please follow-up with PCP for further medication adjustment 

for your uncontrolled diabetes mellitus.


Follow up with: 


[facility] [Primary Care Provider] - 3-5 Days


CLAY DURAN MD [Staff Physician] - 7 Days


Prescriptions: 


Pantoprazole [Protonix] 40 mg PO QDAY #30 tablet

## 2021-02-08 NOTE — PROGRESS NOTE
Assessment and Plan





Echo findings noted - mild concentric LVH, EF 60-65%, impaired LV relaxation, 

RVSP 9mmHg. 


s/p Lexiscan MPI Stress Test today, which was negative.


Currently stable cardiac status. Chest pain resolved. AMI ruled out. Continue 

current cardiac regimen.


Recommend outpatient sleep eval upon discharge. 


Pt may discharge from cardiology standpoint.


Recommend f/u with [facility] within 1-2 weeks. 





Case reviewed in conjunction with Dr. Tello, who agrees with the assessment 

and plan of care.





- Patient Problems


(1) Chest pain


Current Visit: Yes   Status: Resolved   





(2) GREGORY (obstructive sleep apnea)


Current Visit: Yes   Status: Suspected   





(3) Hypertension


Current Visit: Yes   Status: Chronic   


Qualifiers: 


   Hypertension type: essential hypertension   Qualified Code(s): I10 - 

Essential (primary) hypertension   





(4) HLD (hyperlipidemia)


Current Visit: Yes   Status: Chronic   


Qualifiers: 


   Hyperlipidemia type: mixed hyperlipidemia   Qualified Code(s): E78.2 - Mixed 

hyperlipidemia   





(5) DM2 (diabetes mellitus, type 2)


Current Visit: Yes   Status: Chronic   





(6) GERD (gastroesophageal reflux disease)


Current Visit: Yes   Status: Chronic   





(7) Chronic back pain


Current Visit: Yes   Status: Chronic   





(8) H/O: CVA (cerebrovascular accident)


Current Visit: Yes   Status: Chronic   





Subjective


Date of service: 02/08/21


Principal diagnosis: Chest Pain


Interval history: 





Pt resting in bed. No current cardiac complaints. For stress testing today. Tele

reviewed in sinus rhythm HR 90s with a bout of sinus tach noted overnight.





Objective


                                   Vital Signs











  Temp Pulse Resp BP Pulse Ox


 


 02/08/21 11:20  98.0 F  110 H   116/74  94


 


 02/08/21 10:06  98.8 F  92 H  20  154/84  93


 


 02/08/21 10:00   77   


 


 02/08/21 09:19     145/98 


 


 02/08/21 09:17     159/99 


 


 02/08/21 09:15     149/83 


 


 02/08/21 09:14     146/83 


 


 02/08/21 09:11     145/96 


 


 02/08/21 08:45     136/99 


 


 02/08/21 03:40  98.2 F  100 H  20  147/97  97


 


 02/07/21 23:16  97.8 F  102 H  16  140/85  90


 


 02/07/21 19:08  98.1 F  95 H  16  130/83  92


 


 02/07/21 18:22   84   


 


 02/07/21 16:54  97.9 F  83  18  138/95  99














- Physical Examination


General: No Apparent Distress


HEENT: Positive: Normocephaly


Neck: Positive: neck supple, trachea midline


Cardiac: Positive: Reg Rate and Rhythm, S1/S2


Lungs: Positive: Decreased Breath Sounds, No Wheeze, Rales, Rhonchi


Neuro: Positive: Grossly Intact


Abdomen: Positive: Soft.  Negative: Tender


Skin: Negative: Rash


Musculoskeletal: Normal Range of Motion


Extremities: Absent: edema





- Imaging and Cardiology


EKG: report reviewed, image reviewed


Echo: report reviewed (2/6/2021 - mild concentric LVH, EF 60-65%, impaired LV 

relaxation, RVSP 9mmHg. )





- Telemetry


EKG Rhythm: Sinus Rhythm





- EKG


Sinus rhythms and dysrhythmias: sinus rhythm


Myocardial infarction: septal MI (old age or ind, anterior MI (old age or i

## 2022-05-02 ENCOUNTER — HOSPITAL ENCOUNTER (EMERGENCY)
Dept: HOSPITAL 5 - ED | Age: 47
Discharge: HOME | End: 2022-05-02
Payer: SELF-PAY

## 2022-05-02 VITALS — SYSTOLIC BLOOD PRESSURE: 142 MMHG | DIASTOLIC BLOOD PRESSURE: 92 MMHG
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
## 2022-05-02 DIAGNOSIS — S16.1XXA: Primary | ICD-10-CM

## 2022-05-02 DIAGNOSIS — Z91.018: ICD-10-CM

## 2022-05-02 DIAGNOSIS — M54.16: ICD-10-CM
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
## 2022-05-02 PROCEDURE — 99283 EMERGENCY DEPT VISIT LOW MDM: CPT

## 2022-05-02 PROCEDURE — 72040 X-RAY EXAM NECK SPINE 2-3 VW: CPT

## 2022-05-02 PROCEDURE — 72100 X-RAY EXAM L-S SPINE 2/3 VWS: CPT

## 2022-05-02 NOTE — EMERGENCY DEPARTMENT REPORT
ED Fall HPI





- General


Chief Complaint: Fall


Stated Complaint: FELL DOWN STAIRS TWISTED /NECK/NECK/LEGS/FEET


Time Seen by Provider: 22 13:16


Source: patient


Mode of arrival: Ambulatory





- History of Present Illness


Initial Comments: 





46-year-old male with history of hypertension and diabetes presents emerged 

department complaining of continued pain after fall down numerous stairs 1 week 

ago.  Patient states that since the fall he has been having continued spasms and

aches to his neck primarily with twisting and turning of the neck pain is dull 

and throbbing worse with palpation and range of motion.  Reports numbness and 

tingling to the upper extremity also has pain to the lumbar area with palpation.

 She complains of shoot down his left leg.  No numbness or tingling to the foot.

 He is unsure whether the numbness is related to the diabetes for the injury but

the symptoms did not occur until


MD Complaint: fall


-: Sudden, week(s) (1 week ago)


Fall From: standing, down stairs (#) (15)


Fall Witnessed: no


Place Fall Occurred: home


Loss of Consciousness: none


Prolonged Down Time?: no


Symptoms Prior to Fall: none


Severity: moderate


Quality: dull


Associated Symptoms: numbness (Left foot).  denies: shortness of breath, 

abdominal pain, hematuria, vertigo, confusion





- Related Data


                                  Previous Rx's











 Medication  Instructions  Recorded  Last Taken  Type


 


Ketorolac [Toradol] 10 mg PO Q6H PRN #15 tablet 22 Unknown Rx


 


methOCARBAMOL [Robaxin] 750 mg PO Q8H PRN #21 tablet 22 Unknown Rx











                                    Allergies











Allergy/AdvReac Type Severity Reaction Status Date / Time


 


No Known Allergies Allergy   Unverified 22 12:41














ED Review of Systems


ROS: 


Stated complaint: FELL DOWN STAIRS TWISTED /NECK/NECK/LEGS/FEET


Other details as noted in HPI





Comment: All other systems reviewed and negative





ED Past Medical Hx





- Medications


Home Medications: 


                                Home Medications











 Medication  Instructions  Recorded  Confirmed  Last Taken  Type


 


Ketorolac [Toradol] 10 mg PO Q6H PRN #15 tablet 22  Unknown Rx


 


methOCARBAMOL [Robaxin] 750 mg PO Q8H PRN #21 tablet 22  Unknown Rx














ED Physical Exam





- General


Limitations: No Limitations


General appearance: alert, in no apparent distress





- Head


Head exam: Present: atraumatic, normocephalic





- Eye


Eye exam: Present: normal appearance, PERRL, EOMI


Pupils: Present: normal accommodation





- ENT


ENT exam: Present: normal exam, mucous membranes moist, TM's normal bilaterally





- Neck


Neck exam: Present: normal inspection





- Respiratory


Respiratory exam: Present: normal lung sounds bilaterally.  Absent: respiratory 

distress





- Cardiovascular


Cardiovascular Exam: Present: regular rate, normal rhythm.  Absent: systolic 

murmur, diastolic murmur, rubs, gallop





- GI/Abdominal


GI/Abdominal exam: Present: soft, normal bowel sounds





- Rectal


Rectal exam: Present: deferred





- Extremities Exam


Extremities exam: Present: normal inspection





- Back Exam


Back exam: Present: normal inspection





- Neurological Exam


Neurological exam: Present: alert, oriented X3





- Psychiatric


Psychiatric exam: Present: normal affect, normal mood





- Skin


Skin exam: Present: warm, dry, intact, normal color.  Absent: rash





ED Course


                                   Vital Signs











  22





  12:43


 


Temperature 98.5 F


 


Pulse Rate 90


 


Blood Pressure 142/92





[Right] 














ED Medical Decision Making





- Radiology Data


Radiology results: report reviewed





[address] [address]  


 


                                            XRay Report   


                                               Signed  


 


Patient: KEVIN MENDEZ                                                           

     MR#: [id number]  


 


: 1975                                                                

Acct:[id number]      


 


Age/Sex: 46 / M                                                                

ADM Date: 22     


 


Loc: ED       


Attending Dr:   


 


 


Ordering Physician: JAE GREY  


Date of Service: 22  


Procedure(s): XR spine cervical 2-3V  


Accession Number(s): [id number]  


 


cc: JAE GREY   


 


Fluoro Time In Minutes:   


 


CERVICAL SPINE 3 VIEWS  


 


 INDICATION / CLINICAL INFORMATION: neck pain.  


 


 COMPARISON: None available.  


 


 FINDINGS:  


 


 BONES / JOINT(S): No acute fracture or subluxation. Mild degenerative disc 

disease at C4-5 and C5-


6.  


 SOFT TISSUES: No significant abnormality.  


 


 ADDITIONAL FINDINGS: None.  


 


 IMPRESSION:  


 1. No acute findings.  


 


 Signer Name: Amarjit Burger MD   


 Signed: 2022 2:41 PM  


 Workstation Name: [id number]   


 


 


Transcribed By: THANH  


Dictated By: Amarjit Burger MD  


Electronically Authenticated By: Amarjit Burger MD    


Signed Date/Time: 05/02/22 1441                                


 


 


 


DD/DT: 22                                                            

  


TD/TT:


[facility]  


                                     [address] [place] [address]  


 


                                            XRay Report   


                                               Signed  


 


Patient: KEVIN MENDEZ                                                           

     MR#: [id number]  


 


: 1975                                                                

Acct:[id number]      


 


Age/Sex: 46 / M                                                                

ADM Date: 22     


 


Loc: ED       


Attending Dr:   


 


 


Ordering Physician: JAE GREY  


Date of Service: 22  


Procedure(s): XR spine lumbosacral 2-3V  


Accession Number(s): [id number]  


 


cc: JAE GREY   


 


Fluoro Time In Minutes:   


 


LUMBAR SPINE 3 VIEW  


 


 INDICATION / CLINICAL INFORMATION: fall stairs back pain.  


 


 COMPARISON: None available.  


 


 FINDINGS:  


 


 BONES / JOINT(S): No acute fracture or subluxation. No other significant a

bnormality.  


 SOFT TISSUES: No significant abnormality.  


 


 ADDITIONAL FINDINGS: None.  


 


 IMPRESSION:  


 1. No acute findings.  


 


 Signer Name: Amarjit Burger MD   


 Signed: 2022 2:42 PM  


 Workstation Name: [id number]   


 


 


Transcribed By: THANH  


Dictated By: Amarjit Burger MD  


Electronically Authenticated By: Amarjit Burger MD    


Signed Date/Time: 22                                


 


 


 


DD/DT: 22                                                            

  


TD/TT:





- Medical Decision Making





46-year-old male presents emerged department at the expense of mechanical fall 

downstairs 1 week ago with continued pain to his neck and lumbar area which 

resulted in numbness and tingling to his foot.  Presents to have areas evaluated

 for the need for possible treatment. 


Critical care attestation.: 


If time is entered above; I have spent that time in minutes in the direct care 

of this critically ill patient, excluding procedure time.








ED Disposition


Clinical Impression: 


 Low back pain potentially associated with radiculopathy, Cervical strain





Disposition: 01 HOME / SELF CARE / HOMELESS


Is pt being admited?: No


Does the pt Need Aspirin: No


Condition: Stable


Instructions:  How to Use Cold Therapy, Cervical Sprain


Prescriptions: 


methOCARBAMOL [Robaxin] 750 mg PO Q8H PRN #21 tablet


 PRN Reason: Spasms


Ketorolac [Toradol] 10 mg PO Q6H PRN #15 tablet


 PRN Reason: Pain

## 2022-05-02 NOTE — XRAY REPORT
CERVICAL SPINE 3 VIEWS



INDICATION / CLINICAL INFORMATION: neck pain.



COMPARISON: None available.

 

FINDINGS:



BONES / JOINT(S): No acute fracture or subluxation. Mild degenerative disc disease at C4-5 and C5-6.

SOFT TISSUES: No significant abnormality.



ADDITIONAL FINDINGS: None.



IMPRESSION:

1. No acute findings.



Signer Name: Amarjit Burger MD 

Signed: 5/2/2022 2:41 PM

Workstation Name: [id number]

## 2022-05-02 NOTE — XRAY REPORT
LUMBAR SPINE 3 VIEW



INDICATION / CLINICAL INFORMATION: fall stairs back pain.



COMPARISON: None available.

 

FINDINGS:



BONES / JOINT(S): No acute fracture or subluxation. No other significant abnormality.

SOFT TISSUES: No significant abnormality.



ADDITIONAL FINDINGS: None.



IMPRESSION:

1. No acute findings.



Signer Name: Amarjit Burger MD 

Signed: 5/2/2022 2:42 PM

Workstation Name: [id number]

## 2022-06-10 ENCOUNTER — HOSPITAL ENCOUNTER (EMERGENCY)
Dept: HOSPITAL 5 - ED | Age: 47
LOS: 1 days | Discharge: HOME | End: 2022-06-11
Payer: SELF-PAY

## 2022-06-10 DIAGNOSIS — G89.29: ICD-10-CM

## 2022-06-10 DIAGNOSIS — Z91.018: ICD-10-CM

## 2022-06-10 DIAGNOSIS — G47.33: ICD-10-CM

## 2022-06-10 DIAGNOSIS — R07.89: Primary | ICD-10-CM

## 2022-06-10 DIAGNOSIS — Z98.890: ICD-10-CM

## 2022-06-10 DIAGNOSIS — Z76.0: ICD-10-CM

## 2022-06-10 DIAGNOSIS — E11.9: ICD-10-CM

## 2022-06-10 DIAGNOSIS — M54.9: ICD-10-CM

## 2022-06-10 DIAGNOSIS — I10: ICD-10-CM

## 2022-06-10 DIAGNOSIS — G43.909: ICD-10-CM

## 2022-06-10 DIAGNOSIS — K21.9: ICD-10-CM

## 2022-06-10 DIAGNOSIS — Z86.73: ICD-10-CM

## 2022-06-10 LAB
ALBUMIN SERPL-MCNC: 3.8 G/DL (ref 3.9–5)
ALT SERPL-CCNC: 15 UNITS/L (ref 7–56)
BASOPHILS # (AUTO): 0 K/MM3 (ref 0–0.1)
BASOPHILS NFR BLD AUTO: 0.7 % (ref 0–1.8)
BUN SERPL-MCNC: 20 MG/DL (ref 9–20)
BUN/CREAT SERPL: 15 %
CALCIUM SERPL-MCNC: 9.4 MG/DL (ref 8.4–10.2)
EOSINOPHIL # BLD AUTO: 0.1 K/MM3 (ref 0–0.4)
EOSINOPHIL NFR BLD AUTO: 2.6 % (ref 0–4.3)
HCT VFR BLD CALC: 41.9 % (ref 35.5–45.6)
HEMOLYSIS INDEX: 7
HGB BLD-MCNC: 13.1 GM/DL (ref 11.8–15.2)
LYMPHOCYTES # BLD AUTO: 1.4 K/MM3 (ref 1.2–5.4)
LYMPHOCYTES NFR BLD AUTO: 31.1 % (ref 13.4–35)
MCHC RBC AUTO-ENTMCNC: 31 % (ref 32–34)
MCV RBC AUTO: 75 FL (ref 84–94)
MONOCYTES # (AUTO): 0.3 K/MM3 (ref 0–0.8)
MONOCYTES % (AUTO): 6.9 % (ref 0–7.3)
PLATELET # BLD: 285 K/MM3 (ref 140–440)
RBC # BLD AUTO: 5.57 M/MM3 (ref 3.65–5.03)

## 2022-06-10 PROCEDURE — 84484 ASSAY OF TROPONIN QUANT: CPT

## 2022-06-10 PROCEDURE — 71046 X-RAY EXAM CHEST 2 VIEWS: CPT

## 2022-06-10 PROCEDURE — 99284 EMERGENCY DEPT VISIT MOD MDM: CPT

## 2022-06-10 PROCEDURE — 85025 COMPLETE CBC W/AUTO DIFF WBC: CPT

## 2022-06-10 PROCEDURE — 36415 COLL VENOUS BLD VENIPUNCTURE: CPT

## 2022-06-10 PROCEDURE — 93005 ELECTROCARDIOGRAM TRACING: CPT

## 2022-06-10 PROCEDURE — 80053 COMPREHEN METABOLIC PANEL: CPT

## 2022-06-10 NOTE — XRAY REPORT
CHEST 2 VIEWS 



INDICATION / CLINICAL INFORMATION: Chest pain for 3 days.



COMPARISON: 02/05/21.



FINDINGS:



SUPPORT DEVICES: None.

HEART / MEDIASTINUM: The heart size and pulmonary vasculature are normal. The aorta is normal in eladio
mark. 

LUNGS / PLEURA: No significant pulmonary or pleural abnormality. No pneumothorax. 



ADDITIONAL FINDINGS: No significant additional findings.



IMPRESSION: No acute abnormality or significant change.



Signer Name: Johnny Gonzales MD 

Signed: 6/10/2022 11:24 AM

Workstation Name: [id number]

## 2022-06-10 NOTE — EMERGENCY DEPARTMENT REPORT
ED General Adult HPI





- General


Chief complaint: Chest Pain


Stated complaint: BILATERAL FEET SWELLING/CHEST PAIN


Time Seen by Provider: 06/10/22 23:29


Source: patient, RN notes reviewed, old records reviewed


Mode of arrival: Ambulatory


Limitations: No Limitations





- History of Present Illness


Initial comments: 





The patient was evaluated in the emergency department for symptoms described in 

the history of present illness.  He/she was evaluated in the context of the 

global COVID-19 pandemic, which necessitated consideration that the patient 

might be at risk for infection with the virus that causes COVID-19.  

Institutional protocols and algorithms that pertain to the evaluation of 

patients at risk for COVID-19 are in a state of rapid change based on i

nformation released by regulatory bodies including the CDC and federal and state

organizations.  These policies and algorithms were followed during the patient's

care in the emergency department.  Please note that these policies, procedures 

and recommendations changed on a rapid basis.








This is a 46-year-old gentleman.  His past medical history includes chest wall 

pain, hypertension, hyperlipidemia, chronic pain, GERD, type 2 diabetes and 

obstructive sleep apnea.  He had a cardiac nuclear stress test at this hospital 

in  which is negative for ischemic findings.  He presents to the emergency 

room today with complaint of 3 to 4 days of chest wall pain.  No vomiting, or 

diaphoresis.  No travel, surgery, immobilization, or DVT/PE risk factors.  He 

does have lower extremity pain and swelling, which he thinks is secondary to 

neuropathic pain.  He wants to make sure he does not have cellulitis.





He has not been compliant with CPAP for the past week.








-: Gradual, days(s)


Location: chest


Radiation: non-radiation


Quality: aching


Consistency: constant


Improves with: rest


Worsens with: other (Movement and palpation.)





- Related Data


                                Home Medications











 Medication  Instructions  Recorded  Confirmed  Last Taken


 


cloNIDine [Catapres] 0.2 mg PO BID 18


 


Ferrous Sulfate [Iron 325 MG] 325 mg PO BID 08/15/19 02/06/21 Unknown


 


Glimepiride [Amaryl] 4 mg PO DAILY 08/15/19 02/06/21 Unknown


 


Metformin HCl [metFORMIN] 1,000 mg PO BID 08/15/19 02/06/21 02/05/21








                                  Previous Rx's











 Medication  Instructions  Recorded  Last Taken  Type


 


Aspirin EC [Halfprin EC] 81 mg PO QDAY #90 tablet. 06/10/22 Unknown Rx


 


AtorvaSTATin [Lipitor] 20 mg PO DAILY #30 tab 06/10/22 Unknown Rx


 


Pantoprazole [Protonix TAB] 40 mg PO QDAY #30 tablet 06/10/22 Unknown Rx


 


Triamterene-Hctz 75-50 mg Tab 1 tab PO DAILY #30 tab 06/10/22 Unknown Rx


 


amLODIPine 10 mg PO DAILY #30 tab 06/10/22 Unknown Rx











                                    Allergies











Allergy/AdvReac Type Severity Reaction Status Date / Time


 


tomato [Tomato] Allergy  Swelling Verified 22 12:01














ED Review of Systems


ROS: 


Stated complaint: BILATERAL FEET SWELLING/CHEST PAIN


Other details as noted in HPI





Constitutional: denies: fever


Eyes: denies: eye discharge


ENT: denies: epistaxis


Respiratory: denies: cough


Cardiovascular: chest pain, edema


Gastrointestinal: denies: abdominal pain, vomiting


Musculoskeletal: myalgia


Neurological: denies: weakness


Psychiatric: anxiety





ED Past Medical Hx





- Past Medical History


Hx Hypertension: Yes


Hx CVA: Yes


Hx Congestive Heart Failure: No


Hx Diabetes: Yes


Hx GERD: Yes


Hx Headaches / Migraines: Yes


Hx Asthma: No


Hx COPD: No


Additional medical history: sleep apnea, hiatal hernia.  chronic back pain, 

"heart problems"





- Surgical History


Additional Surgical History: Right upper extremity surgery secondary to trauma, 

cardiac catheterization





- Social History


Smoking Status: Never Smoker





- Medications


Home Medications: 


                                Home Medications











 Medication  Instructions  Recorded  Confirmed  Last Taken  Type


 


cloNIDine [Catapres] 0.2 mg PO BID 18 History


 


Ferrous Sulfate [Iron 325 MG] 325 mg PO BID 08/15/19 02/06/21 Unknown History


 


Glimepiride [Amaryl] 4 mg PO DAILY 08/15/19 02/06/21 Unknown History


 


Metformin HCl [metFORMIN] 1,000 mg PO BID 08/15/19 02/06/21 02/05/21 History


 


Aspirin EC [Halfprin EC] 81 mg PO QDAY #90 tablet. 06/10/22  Unknown Rx


 


AtorvaSTATin [Lipitor] 20 mg PO DAILY #30 tab 06/10/22  Unknown Rx


 


Pantoprazole [Protonix TAB] 40 mg PO QDAY #30 tablet 06/10/22  Unknown Rx


 


Triamterene-Hctz 75-50 mg Tab 1 tab PO DAILY #30 tab 06/10/22  Unknown Rx


 


amLODIPine 10 mg PO DAILY #30 tab 06/10/22  Unknown Rx














ED Physical Exam





- General


Limitations: No Limitations


General appearance: alert, in no apparent distress, obese





- Head


Head exam: Present: atraumatic, normocephalic





- Eye


Eye exam: Present: normal appearance, EOMI





- ENT


ENT exam: Present: normal exam, normal orophraynx, mucous membranes moist, 

normal external ear exam





- Neck


Neck exam: Present: normal inspection, full ROM.  Absent: tenderness, 

meningismus





- Respiratory


Respiratory exam: Present: normal lung sounds bilaterally, chest wall 

tenderness.  Absent: respiratory distress, wheezes, rales, rhonchi, stridor





- Cardiovascular


Cardiovascular Exam: Present: regular rate, normal rhythm, normal heart sounds. 

Absent: bradycardia, tachycardia, irregular rhythm, systolic murmur, diastolic 

murmur, rubs, gallop





- GI/Abdominal


GI/Abdominal exam: Present: soft.  Absent: distended, tenderness, guarding, 

rebound, rigid, pulsatile mass





- Rectal


Rectal exam: Present: deferred





- Extremities Exam


Extremities exam: Present: normal inspection, full ROM, pedal edema (1+ edema in

the bilateral lower extremity), other (2+ pulses noted in the bilateral upper 

and lower extremities.  There is no palpable cord.   negative Homans sign.  

Muscular compartments are soft.  The pelvis is stable.).  Absent: calf 

tenderness





- Back Exam


Back exam: Present: normal inspection.  Absent: tenderness, CVA tenderness (R), 

CVA tenderness (L), paraspinal tenderness, vertebral tenderness





- Neurological Exam


Neurological exam: Present: alert, oriented X3, normal gait, other (No facial 

droop.  Tongue midline.  Extraocular movements intact bilaterally.  Facial 

sensation intact to light touch in V1, V2, V3 distribution bilaterally.  5 and a

5 strength in 4 extremities.  Sensation intact to light touch in 4 

extremities.).  Absent: motor sensory deficit





- Psychiatric


Psychiatric exam: Present: normal affect, normal mood





- Skin


Skin exam: Present: warm, dry, intact, normal color.  Absent: rash





ED Course





                                   Vital Signs











  06/10/22





  11:02


 


Temperature 97.7 F


 


Pulse Rate 80


 


Respiratory 16





Rate 


 


Blood Pressure 151/97





[Left] 


 


O2 Sat by Pulse 96





Oximetry 














- Pulse Oximetry Interpretation


  ** Digit-Finger


Initial Pulse Oximetry Readin


O2 Sat by Pulse Oximetry: 99


Actions Taken: none





ED Medical Decision Making





- Lab Data


Result diagrams: 


                                 06/10/22 11:34





                                 06/10/22 11:34








                                   Vital Signs











  06/10/22





  11:02


 


Temperature 97.7 F


 


Pulse Rate 80


 


Respiratory 16





Rate 


 


Blood Pressure 151/97





[Left] 


 


O2 Sat by Pulse 96





Oximetry 











                                   Lab Results











  06/10/22 06/10/22 06/10/22 Range/Units





  11:34 11:34 13:45 


 


WBC  4.7    (4.5-11.0)  K/mm3


 


RBC  5.57 H    (3.65-5.03)  M/mm3


 


Hgb  13.1    (11.8-15.2)  gm/dl


 


Hct  41.9    (35.5-45.6)  %


 


MCV  75 L    (84-94)  fl


 


MCH  24 L    (28-32)  pg


 


MCHC  31 L    (32-34)  %


 


RDW  16.4 H    (13.2-15.2)  %


 


Plt Count  285    (140-440)  K/mm3


 


Lymph % (Auto)  31.1    (13.4-35.0)  %


 


Mono % (Auto)  6.9    (0.0-7.3)  %


 


Eos % (Auto)  2.6    (0.0-4.3)  %


 


Baso % (Auto)  0.7    (0.0-1.8)  %


 


Lymph # (Auto)  1.4    (1.2-5.4)  K/mm3


 


Mono # (Auto)  0.3    (0.0-0.8)  K/mm3


 


Eos # (Auto)  0.1    (0.0-0.4)  K/mm3


 


Baso # (Auto)  0.0    (0.0-0.1)  K/mm3


 


Seg Neutrophils %  58.7    (40.0-70.0)  %


 


Seg Neutrophils #  2.7    (1.8-7.7)  K/mm3


 


Sodium   138   (137-145)  mmol/L


 


Potassium   4.2   (3.6-5.0)  mmol/L


 


Chloride   101.7   ()  mmol/L


 


Carbon Dioxide   22   (22-30)  mmol/L


 


Anion Gap   19   mmol/L


 


BUN   20   (9-20)  mg/dL


 


Creatinine   1.3   (0.8-1.3)  mg/dL


 


Estimated GFR   > 60   ml/min


 


BUN/Creatinine Ratio   15   %


 


Glucose   241 H   ()  mg/dL


 


Calcium   9.4   (8.4-10.2)  mg/dL


 


Total Bilirubin   0.30   (0.1-1.2)  mg/dL


 


AST   12   (5-40)  units/L


 


ALT   15   (7-56)  units/L


 


Alkaline Phosphatase   67   ()  units/L


 


Troponin T   < 0.010  < 0.010  (0.00-0.029)  ng/mL


 


Total Protein   7.7   (6.3-8.2)  g/dL


 


Albumin   3.8 L   (3.9-5)  g/dL


 


Albumin/Globulin Ratio   1.0   %














  06/10/22 Range/Units





  16:42 


 


WBC   (4.5-11.0)  K/mm3


 


RBC   (3.65-5.03)  M/mm3


 


Hgb   (11.8-15.2)  gm/dl


 


Hct   (35.5-45.6)  %


 


MCV   (84-94)  fl


 


MCH   (28-32)  pg


 


MCHC   (32-34)  %


 


RDW   (13.2-15.2)  %


 


Plt Count   (140-440)  K/mm3


 


Lymph % (Auto)   (13.4-35.0)  %


 


Mono % (Auto)   (0.0-7.3)  %


 


Eos % (Auto)   (0.0-4.3)  %


 


Baso % (Auto)   (0.0-1.8)  %


 


Lymph # (Auto)   (1.2-5.4)  K/mm3


 


Mono # (Auto)   (0.0-0.8)  K/mm3


 


Eos # (Auto)   (0.0-0.4)  K/mm3


 


Baso # (Auto)   (0.0-0.1)  K/mm3


 


Seg Neutrophils %   (40.0-70.0)  %


 


Seg Neutrophils #   (1.8-7.7)  K/mm3


 


Sodium   (137-145)  mmol/L


 


Potassium   (3.6-5.0)  mmol/L


 


Chloride   ()  mmol/L


 


Carbon Dioxide   (22-30)  mmol/L


 


Anion Gap   mmol/L


 


BUN   (9-20)  mg/dL


 


Creatinine   (0.8-1.3)  mg/dL


 


Estimated GFR   ml/min


 


BUN/Creatinine Ratio   %


 


Glucose   ()  mg/dL


 


Calcium   (8.4-10.2)  mg/dL


 


Total Bilirubin   (0.1-1.2)  mg/dL


 


AST   (5-40)  units/L


 


ALT   (7-56)  units/L


 


Alkaline Phosphatase   ()  units/L


 


Troponin T  < 0.010  (0.00-0.029)  ng/mL


 


Total Protein   (6.3-8.2)  g/dL


 


Albumin   (3.9-5)  g/dL


 


Albumin/Globulin Ratio   %














- EKG Data


-: EKG Interpreted by Me


EKG shows normal: sinus rhythm


Rate: normal





- EKG Data


When compared to previous EKG there are: no significant change





06/10/22 23:44


The EKG today is interpreted at 11: 45





Sinus rhythm, normal axis, rate 74 bpm.  Motion artifact, low voltage in the 

lateral leads.  Persistent abnormal EKG.  This is not a STEMI.  This is 

unchanged from prior EKG from 2021





- Radiology Data


Radiology results: pending, report reviewed, image reviewed





CHEST 2 VIEWS  INDICATION / CLINICAL INFORMATION: Chest pain for 3 days.  

COMPARISON: 21.  FINDINGS:  SUPPORT DEVICES: None. HEART / MEDIASTINUM: 

The heart size and pulmonary vasculature are normal. The aorta is normal in 

caliber. LUNGS / PLEURA: No significant pulmonary or pleural abnormality. No 

pneumothorax.  ADDITIONAL FINDINGS: No significant additional findings.  

IMPRESSION: No acute abnormality or significant change.  Signer Name: Johnny Gonzales MD Signed: 6/10/2022 10:24 AM Workstation Name: [id number]





- Medical Decision Making





Differential diagnosis, including but not limited to: Pneumonia, CHF, 

costochondritis, neuropathy, coronary artery disease





Assessment and plan: 46-year-old gentleman, who is not currently tachycardic, 

tachypneic or hypoxic, who denies DVT and pulmonary embolism risk factors, who 

is low risk by Wells criteria for pulmonary embolism, presenting with 

reproducible anterior chest wall pain.  Pain present for 3 days.  Troponin 

negative x3.  X-ray of the chest unremarkable.  Laboratory studies unremarkable.

  Does not appear to have decompensated CHF.  Cardiovascular risk factor profile

 reviewed and appreciated.  He had a negative cardiac nuclear stress test last 

year.





Suspect costochondritis, likely secondary to obstructive sleep apnea.





Counseled to remain compliant with medications, and CPAP.  Also educated patient

 on where he maintain insurance, through the [email or web address] website.  Patient 

observed in this department for hours without clinical decompensation.  I will 

refill his maintenance medications.  We will give him follow-up with outpatient 

primary care and/or cardiology.





Return precautions are reviewed.








Critical care attestation.: 


If time is entered above; I have spent that time in minutes in the direct care 

of this critically ill patient, excluding procedure time.








ED Disposition


Clinical Impression: 


 Body mass index 36.0-36.9, adult, Chest wall pain, Obstructive sleep apnea, 

Medication refill





Disposition:  HOME / SELF CARE / HOMELESS


Is pt being admited?: No


Does the pt Need Aspirin: No


Condition: Good


Instructions:  Costochondritis, Sleep Apnea, Easy-to-Read


Additional Instructions: 


We recommend that the patient exercise as tolerated, diet, and lose weight.  A

void consumption of alcohol, tobacco, smoke products.  Recommend that patient 

remain compliant with CPAP.  The patient is encouraged to follow-up with his 

outpatient primary care doctor or cardiologist within the next week.  Patient 

will be given follow-up information for these respective providers.





Patient may go into the [email or web address] website, and follow instructions to 

obtain insurance to the [email or web address] website.





Please return to the emergency room right away with new pain, worsened pain, 

migration of pain, projectile vomiting, change in mental status, confusion, 

inability tolerate liquid feeds, new, worsened or different symptoms not present

 on the initial emergency room evaluation








Patient should use his CPAP as directed and prescribed by his sleep specialist. 

 Patient is encouraged to use distilled water for his CPAP machine if it 

requires it.  If patient has run out of CPAP supplies, he should be able to 

purchase them from [employer] or some other supplier.


Referrals: 


[facility] [Provider Group] - 3-5 Days


[facility] [Provider Group] - 3-5 Days


Forms:  Work/School Release Form(ED)

## 2022-06-10 NOTE — ELECTROCARDIOGRAPH REPORT
[facility]

                                       

Test Date:    2022-06-10               Test Time:    11:45:03

Pat Name:     KEVIN MENDEZ             Department:   

Patient ID:   [id number]          Room:          

Gender:       M                        Technician:   FRANCIS

:          1975               Requested By: ED DOC

Order Number: [id number]              Reading MD:   Johanne Bills

                                 Measurements

Intervals                              Axis          

Rate:         74                       P:            31

SC:           149                      QRS:          0

QRSD:         84                       T:            -29

QT:           360                                    

QTc:          400                                    

                           Interpretive Statements

Sinus rhythm

Nonspecific T wave flattening

No previous ECG available for comparison

Electronically Signed On 6- 13:36:10 EDT by Johanne Bills

## 2022-06-11 VITALS — DIASTOLIC BLOOD PRESSURE: 104 MMHG | SYSTOLIC BLOOD PRESSURE: 169 MMHG

## 2024-04-17 NOTE — XRAY REPORT
CHEST 1 VIEW 6:56 PM



INDICATION / CLINICAL INFORMATION:

Shortness of breath. Tingling down left side.



COMPARISON: 

10/19/2019.



FINDINGS:



SUPPORT DEVICES: None.

HEART / MEDIASTINUM: The heart size and pulmonary vasculature are normal. 

LUNGS / PLEURA: No significant pulmonary or pleural abnormality. No pneumothorax. 

ADDITIONAL FINDINGS: No significant additional findings.



IMPRESSION: No acute abnormality or significant change.



Signer Name: Johnny Gonzales MD 

Signed: 1/6/2020 7:14 PM

 Workstation Name: [id number] room air